# Patient Record
Sex: MALE | Race: WHITE | NOT HISPANIC OR LATINO | Employment: UNEMPLOYED | URBAN - METROPOLITAN AREA
[De-identification: names, ages, dates, MRNs, and addresses within clinical notes are randomized per-mention and may not be internally consistent; named-entity substitution may affect disease eponyms.]

---

## 2023-04-24 ENCOUNTER — LAB (OUTPATIENT)
Dept: LAB | Facility: LAB | Age: 53
End: 2023-04-24
Payer: COMMERCIAL

## 2023-04-24 ENCOUNTER — OFFICE VISIT (OUTPATIENT)
Dept: PRIMARY CARE | Facility: CLINIC | Age: 53
End: 2023-04-24
Payer: COMMERCIAL

## 2023-04-24 VITALS
BODY MASS INDEX: 30.35 KG/M2 | HEART RATE: 76 BPM | WEIGHT: 195.2 LBS | SYSTOLIC BLOOD PRESSURE: 132 MMHG | DIASTOLIC BLOOD PRESSURE: 86 MMHG | OXYGEN SATURATION: 98 % | RESPIRATION RATE: 18 BRPM

## 2023-04-24 DIAGNOSIS — R63.8 INCREASED BMI (BODY MASS INDEX): ICD-10-CM

## 2023-04-24 DIAGNOSIS — G44.86 CERVICOGENIC HEADACHE: ICD-10-CM

## 2023-04-24 DIAGNOSIS — G44.86 CERVICOGENIC HEADACHE: Primary | ICD-10-CM

## 2023-04-24 PROBLEM — H53.9 VISUAL DISTURBANCES: Status: ACTIVE | Noted: 2023-04-24

## 2023-04-24 PROBLEM — F41.8 SITUATIONAL ANXIETY: Status: ACTIVE | Noted: 2023-04-24

## 2023-04-24 PROBLEM — R09.82 POST-NASAL DRIP: Status: ACTIVE | Noted: 2023-04-24

## 2023-04-24 PROBLEM — M23.305 DERANGEMENT OF MEDIAL MENISCUS: Status: ACTIVE | Noted: 2023-04-24

## 2023-04-24 PROBLEM — N28.9 KIDNEY LESION, NATIVE, RIGHT: Status: ACTIVE | Noted: 2023-04-24

## 2023-04-24 PROBLEM — J32.3 CHRONIC SPHENOIDAL SINUSITIS: Status: ACTIVE | Noted: 2023-04-24

## 2023-04-24 PROBLEM — R07.89 ATYPICAL CHEST PAIN: Status: ACTIVE | Noted: 2023-04-24

## 2023-04-24 PROBLEM — G93.0 ARACHNOID CYST: Status: ACTIVE | Noted: 2023-04-24

## 2023-04-24 PROBLEM — T14.8XXA MUSCLE STRAIN: Status: ACTIVE | Noted: 2023-04-24

## 2023-04-24 PROBLEM — M54.2 CERVICALGIA: Status: ACTIVE | Noted: 2023-04-24

## 2023-04-24 PROBLEM — G57.10 MERALGIA PARESTHETICA: Status: ACTIVE | Noted: 2023-04-24

## 2023-04-24 PROBLEM — R10.12 LEFT UPPER QUADRANT ABDOMINAL PAIN: Status: ACTIVE | Noted: 2023-04-24

## 2023-04-24 PROBLEM — M79.18 MYOFASCIAL PAIN: Status: ACTIVE | Noted: 2023-04-24

## 2023-04-24 PROBLEM — K58.9 IRRITABLE BOWEL SYNDROME: Status: ACTIVE | Noted: 2023-04-24

## 2023-04-24 PROBLEM — R63.5 WEIGHT GAIN: Status: ACTIVE | Noted: 2023-04-24

## 2023-04-24 PROBLEM — K52.9 CHRONIC DIARRHEA: Status: ACTIVE | Noted: 2023-04-24

## 2023-04-24 PROBLEM — I10 BENIGN ESSENTIAL HTN: Status: ACTIVE | Noted: 2023-04-24

## 2023-04-24 PROBLEM — J30.9 ALLERGIC RHINITIS: Status: ACTIVE | Noted: 2023-04-24

## 2023-04-24 PROBLEM — R73.9 ELEVATED BLOOD SUGAR: Status: ACTIVE | Noted: 2023-04-24

## 2023-04-24 PROBLEM — M48.02 FORAMINAL STENOSIS OF CERVICAL REGION: Status: ACTIVE | Noted: 2023-04-24

## 2023-04-24 PROBLEM — N28.1 KIDNEY CYST, ACQUIRED: Status: ACTIVE | Noted: 2023-04-24

## 2023-04-24 PROBLEM — E78.49 ESSENTIAL FAMILIAL HYPERLIPIDEMIA: Status: ACTIVE | Noted: 2023-04-24

## 2023-04-24 PROBLEM — F41.9 ANXIETY DISORDER: Status: ACTIVE | Noted: 2023-04-24

## 2023-04-24 PROBLEM — M48.02 CERVICAL SPINAL STENOSIS: Status: ACTIVE | Noted: 2023-04-24

## 2023-04-24 PROBLEM — G47.00 INSOMNIA: Status: ACTIVE | Noted: 2023-04-24

## 2023-04-24 PROBLEM — M99.09 SEGMENTAL AND SOMATIC DYSFUNCTION: Status: ACTIVE | Noted: 2023-04-24

## 2023-04-24 PROBLEM — M79.9 POSTURAL STRAIN: Status: ACTIVE | Noted: 2023-04-24

## 2023-04-24 PROBLEM — H69.93 ETD (EUSTACHIAN TUBE DYSFUNCTION), BILATERAL: Status: ACTIVE | Noted: 2023-04-24

## 2023-04-24 PROBLEM — R51.9 HEADACHE: Status: ACTIVE | Noted: 2023-04-24

## 2023-04-24 PROBLEM — M76.60 ACHILLES TENDINITIS: Status: ACTIVE | Noted: 2023-04-24

## 2023-04-24 PROBLEM — J34.2 DEVIATED NASAL SEPTUM: Status: ACTIVE | Noted: 2023-04-24

## 2023-04-24 PROBLEM — J32.2 CHRONIC ETHMOIDAL SINUSITIS: Status: ACTIVE | Noted: 2023-04-24

## 2023-04-24 PROBLEM — M79.10 MYALGIA: Status: ACTIVE | Noted: 2023-04-24

## 2023-04-24 PROBLEM — K21.9 GERD (GASTROESOPHAGEAL REFLUX DISEASE): Status: ACTIVE | Noted: 2023-04-24

## 2023-04-24 PROBLEM — M79.606 LOWER EXTREMITY PAIN: Status: ACTIVE | Noted: 2023-04-24

## 2023-04-24 PROBLEM — M25.519 SHOULDER PAIN: Status: ACTIVE | Noted: 2023-04-24

## 2023-04-24 PROBLEM — M54.12 ACUTE CERVICAL RADICULOPATHY: Status: ACTIVE | Noted: 2023-04-24

## 2023-04-24 PROBLEM — R68.82 LIBIDO, DECREASED: Status: ACTIVE | Noted: 2023-04-24

## 2023-04-24 PROBLEM — J45.991 COUGH VARIANT ASTHMA (HHS-HCC): Status: ACTIVE | Noted: 2023-04-24

## 2023-04-24 PROBLEM — K21.00 GERD WITH ESOPHAGITIS: Status: ACTIVE | Noted: 2023-04-24

## 2023-04-24 LAB
ALANINE AMINOTRANSFERASE (SGPT) (U/L) IN SER/PLAS: 30 U/L (ref 10–52)
ALBUMIN (G/DL) IN SER/PLAS: 4.6 G/DL (ref 3.4–5)
ALKALINE PHOSPHATASE (U/L) IN SER/PLAS: 32 U/L (ref 33–120)
ANION GAP IN SER/PLAS: 15 MMOL/L (ref 10–20)
ASPARTATE AMINOTRANSFERASE (SGOT) (U/L) IN SER/PLAS: 23 U/L (ref 9–39)
BASOPHILS (10*3/UL) IN BLOOD BY AUTOMATED COUNT: 0.03 X10E9/L (ref 0–0.1)
BASOPHILS/100 LEUKOCYTES IN BLOOD BY AUTOMATED COUNT: 0.4 % (ref 0–2)
BILIRUBIN TOTAL (MG/DL) IN SER/PLAS: 0.7 MG/DL (ref 0–1.2)
C REACTIVE PROTEIN (MG/L) IN SER/PLAS: <0.1 MG/DL
CALCIUM (MG/DL) IN SER/PLAS: 9.2 MG/DL (ref 8.6–10.3)
CARBON DIOXIDE, TOTAL (MMOL/L) IN SER/PLAS: 26 MMOL/L (ref 21–32)
CHLORIDE (MMOL/L) IN SER/PLAS: 100 MMOL/L (ref 98–107)
CREATININE (MG/DL) IN SER/PLAS: 0.92 MG/DL (ref 0.5–1.3)
EOSINOPHILS (10*3/UL) IN BLOOD BY AUTOMATED COUNT: 0.05 X10E9/L (ref 0–0.7)
EOSINOPHILS/100 LEUKOCYTES IN BLOOD BY AUTOMATED COUNT: 0.7 % (ref 0–6)
ERYTHROCYTE DISTRIBUTION WIDTH (RATIO) BY AUTOMATED COUNT: 11.9 % (ref 11.5–14.5)
ERYTHROCYTE MEAN CORPUSCULAR HEMOGLOBIN CONCENTRATION (G/DL) BY AUTOMATED: 34 G/DL (ref 32–36)
ERYTHROCYTE MEAN CORPUSCULAR VOLUME (FL) BY AUTOMATED COUNT: 94 FL (ref 80–100)
ERYTHROCYTES (10*6/UL) IN BLOOD BY AUTOMATED COUNT: 4.97 X10E12/L (ref 4.5–5.9)
GFR MALE: >90 ML/MIN/1.73M2
GLUCOSE (MG/DL) IN SER/PLAS: 90 MG/DL (ref 74–99)
HEMATOCRIT (%) IN BLOOD BY AUTOMATED COUNT: 46.7 % (ref 41–52)
HEMOGLOBIN (G/DL) IN BLOOD: 15.9 G/DL (ref 13.5–17.5)
IMMATURE GRANULOCYTES/100 LEUKOCYTES IN BLOOD BY AUTOMATED COUNT: 0.3 % (ref 0–0.9)
LEUKOCYTES (10*3/UL) IN BLOOD BY AUTOMATED COUNT: 7.1 X10E9/L (ref 4.4–11.3)
LYMPHOCYTES (10*3/UL) IN BLOOD BY AUTOMATED COUNT: 1.67 X10E9/L (ref 1.2–4.8)
LYMPHOCYTES/100 LEUKOCYTES IN BLOOD BY AUTOMATED COUNT: 23.6 % (ref 13–44)
MONOCYTES (10*3/UL) IN BLOOD BY AUTOMATED COUNT: 0.45 X10E9/L (ref 0.1–1)
MONOCYTES/100 LEUKOCYTES IN BLOOD BY AUTOMATED COUNT: 6.3 % (ref 2–10)
NEUTROPHILS (10*3/UL) IN BLOOD BY AUTOMATED COUNT: 4.87 X10E9/L (ref 1.2–7.7)
NEUTROPHILS/100 LEUKOCYTES IN BLOOD BY AUTOMATED COUNT: 68.7 % (ref 40–80)
PLATELETS (10*3/UL) IN BLOOD AUTOMATED COUNT: 202 X10E9/L (ref 150–450)
POTASSIUM (MMOL/L) IN SER/PLAS: 4 MMOL/L (ref 3.5–5.3)
PROTEIN TOTAL: 7.4 G/DL (ref 6.4–8.2)
SEDIMENTATION RATE, ERYTHROCYTE: 8 MM/H (ref 0–20)
SODIUM (MMOL/L) IN SER/PLAS: 137 MMOL/L (ref 136–145)
THYROTROPIN (MIU/L) IN SER/PLAS BY DETECTION LIMIT <= 0.05 MIU/L: 0.64 MIU/L (ref 0.44–3.98)
UREA NITROGEN (MG/DL) IN SER/PLAS: 18 MG/DL (ref 6–23)

## 2023-04-24 PROCEDURE — 3075F SYST BP GE 130 - 139MM HG: CPT | Performed by: INTERNAL MEDICINE

## 2023-04-24 PROCEDURE — 3079F DIAST BP 80-89 MM HG: CPT | Performed by: INTERNAL MEDICINE

## 2023-04-24 PROCEDURE — 80053 COMPREHEN METABOLIC PANEL: CPT

## 2023-04-24 PROCEDURE — 36415 COLL VENOUS BLD VENIPUNCTURE: CPT

## 2023-04-24 PROCEDURE — 86140 C-REACTIVE PROTEIN: CPT

## 2023-04-24 PROCEDURE — 85025 COMPLETE CBC W/AUTO DIFF WBC: CPT

## 2023-04-24 PROCEDURE — 99214 OFFICE O/P EST MOD 30 MIN: CPT | Performed by: INTERNAL MEDICINE

## 2023-04-24 PROCEDURE — 85652 RBC SED RATE AUTOMATED: CPT

## 2023-04-24 PROCEDURE — 84443 ASSAY THYROID STIM HORMONE: CPT

## 2023-04-24 PROCEDURE — 1036F TOBACCO NON-USER: CPT | Performed by: INTERNAL MEDICINE

## 2023-04-24 RX ORDER — MAGNESIUM 250 MG
TABLET ORAL EVERY 12 HOURS
COMMUNITY
Start: 2021-09-01

## 2023-04-24 RX ORDER — OMEPRAZOLE 20 MG/1
1 CAPSULE, DELAYED RELEASE ORAL DAILY
COMMUNITY
Start: 2021-04-08 | End: 2023-07-17 | Stop reason: SDUPTHER

## 2023-04-24 RX ORDER — AZELASTINE HYDROCHLORIDE 0.5 MG/ML
SOLUTION/ DROPS OPHTHALMIC
COMMUNITY
End: 2023-11-06 | Stop reason: ALTCHOICE

## 2023-04-24 RX ORDER — GABAPENTIN 100 MG/1
100 CAPSULE ORAL NIGHTLY
Qty: 30 CAPSULE | Refills: 1 | Status: SHIPPED | OUTPATIENT
Start: 2023-04-24 | End: 2023-07-28 | Stop reason: ALTCHOICE

## 2023-04-24 RX ORDER — ALBUTEROL SULFATE 90 UG/1
AEROSOL, METERED RESPIRATORY (INHALATION)
COMMUNITY
Start: 2021-06-21

## 2023-04-24 RX ORDER — AMLODIPINE BESYLATE 10 MG/1
10 TABLET ORAL DAILY
COMMUNITY
End: 2023-07-28 | Stop reason: SDUPTHER

## 2023-04-24 RX ORDER — HYDROXYZINE HYDROCHLORIDE 25 MG/1
TABLET, FILM COATED ORAL
COMMUNITY
Start: 2021-08-26 | End: 2023-07-28 | Stop reason: SDUPTHER

## 2023-04-24 RX ORDER — EPINASTINE HYDROCHLORIDE 0.5 MG/ML
SOLUTION/ DROPS OPHTHALMIC
COMMUNITY
Start: 2021-06-09 | End: 2023-11-06 | Stop reason: ALTCHOICE

## 2023-04-24 RX ORDER — LATANOPROST 50 UG/ML
SOLUTION/ DROPS OPHTHALMIC
COMMUNITY
Start: 2022-01-04 | End: 2023-11-06 | Stop reason: ALTCHOICE

## 2023-04-24 ASSESSMENT — PATIENT HEALTH QUESTIONNAIRE - PHQ9
SUM OF ALL RESPONSES TO PHQ9 QUESTIONS 1 AND 2: 0
2. FEELING DOWN, DEPRESSED OR HOPELESS: NOT AT ALL
1. LITTLE INTEREST OR PLEASURE IN DOING THINGS: NOT AT ALL

## 2023-04-24 ASSESSMENT — ENCOUNTER SYMPTOMS
DEPRESSION: 0
OCCASIONAL FEELINGS OF UNSTEADINESS: 0
LOSS OF SENSATION IN FEET: 0

## 2023-04-24 NOTE — PROGRESS NOTES
Subjective   Patient ID: Chalino Soriano is a 52 y.o. male who presents for Headache and Back Pain (Lower back and upper spine).    HPI     Neck pain/Cephalgia    Review of Systems      No Fever/chills/dizziness/chest pains/ shortness of breath/palpitations/Nausea/vomiting/diarrhea/ constipation/urine frequency/blood in urine.      Objective   BP (!) 145/91 (BP Location: Left arm, Patient Position: Sitting, BP Cuff Size: Adult)   Pulse 76   Resp 18   Wt 88.5 kg (195 lb 3.2 oz)   SpO2 98%   BMI 30.35 kg/m²     Physical Exam    No JVP elevation. No palpable Lymph Nodes. No Thyromegaly    CVS-NL S1/S2 . No MRG    Lungs-CTA. B/S= B/L    Abdomen-Soft, Non-tender. No masses or HSM    Extremities: No C/C/E      Assessment/Plan        Cephalgia    Gabapentin 100 mg at bedtime/PRN    PMR consult    Stress    Elevated BMI

## 2023-04-28 ENCOUNTER — LAB (OUTPATIENT)
Dept: LAB | Facility: LAB | Age: 53
End: 2023-04-28
Payer: COMMERCIAL

## 2023-04-28 DIAGNOSIS — R68.82 LIBIDO, DECREASED: Primary | ICD-10-CM

## 2023-04-28 DIAGNOSIS — R68.82 LIBIDO, DECREASED: ICD-10-CM

## 2023-04-28 PROCEDURE — 36415 COLL VENOUS BLD VENIPUNCTURE: CPT

## 2023-04-28 PROCEDURE — 84402 ASSAY OF FREE TESTOSTERONE: CPT

## 2023-04-28 PROCEDURE — 84403 ASSAY OF TOTAL TESTOSTERONE: CPT

## 2023-05-09 LAB
TESTOSTERONE FREE (CHAN): 65.6 PG/ML (ref 35–155)
TESTOSTERONE,TOTAL,LC-MS/MS: 345 NG/DL (ref 250–1100)

## 2023-07-12 ENCOUNTER — TELEPHONE (OUTPATIENT)
Dept: PRIMARY CARE | Facility: CLINIC | Age: 53
End: 2023-07-12
Payer: COMMERCIAL

## 2023-07-12 NOTE — TELEPHONE ENCOUNTER
PT stated that he would like a CB about his recent lab results and would also like to discuss some acid reflux issues he has been having

## 2023-07-17 DIAGNOSIS — K21.9 GASTROESOPHAGEAL REFLUX DISEASE, UNSPECIFIED WHETHER ESOPHAGITIS PRESENT: ICD-10-CM

## 2023-07-17 RX ORDER — OMEPRAZOLE 20 MG/1
20 CAPSULE, DELAYED RELEASE ORAL DAILY
Qty: 30 CAPSULE | Refills: 2 | Status: SHIPPED | OUTPATIENT
Start: 2023-07-17 | End: 2024-02-15 | Stop reason: SDUPTHER

## 2023-07-17 NOTE — TELEPHONE ENCOUNTER
Spoke to patient and informed him that [facility] said his total and free and T levels were all normal and he would like for him to start omeprazole 20mg daily. I informed patient that I will send that over to his pharmacy on file. Patient had a few questions but stated that he will call back tomorrow. Patient was identified with full name and date of birth.       Dee Sales MA

## 2023-07-20 ENCOUNTER — TELEPHONE (OUTPATIENT)
Dept: PRIMARY CARE | Facility: CLINIC | Age: 53
End: 2023-07-20
Payer: COMMERCIAL

## 2023-07-28 ENCOUNTER — LAB (OUTPATIENT)
Dept: LAB | Facility: LAB | Age: 53
End: 2023-07-28
Payer: COMMERCIAL

## 2023-07-28 ENCOUNTER — OFFICE VISIT (OUTPATIENT)
Dept: PRIMARY CARE | Facility: CLINIC | Age: 53
End: 2023-07-28
Payer: COMMERCIAL

## 2023-07-28 VITALS
SYSTOLIC BLOOD PRESSURE: 139 MMHG | HEIGHT: 69 IN | OXYGEN SATURATION: 96 % | BODY MASS INDEX: 29.44 KG/M2 | RESPIRATION RATE: 18 BRPM | HEART RATE: 76 BPM | WEIGHT: 198.8 LBS | DIASTOLIC BLOOD PRESSURE: 94 MMHG

## 2023-07-28 DIAGNOSIS — I10 BENIGN ESSENTIAL HTN: ICD-10-CM

## 2023-07-28 DIAGNOSIS — Z00.00 HEALTHCARE MAINTENANCE: Primary | ICD-10-CM

## 2023-07-28 DIAGNOSIS — F51.01 PRIMARY INSOMNIA: ICD-10-CM

## 2023-07-28 DIAGNOSIS — E78.49 ESSENTIAL FAMILIAL HYPERLIPIDEMIA: ICD-10-CM

## 2023-07-28 DIAGNOSIS — Z00.00 HEALTHCARE MAINTENANCE: ICD-10-CM

## 2023-07-28 LAB
ALANINE AMINOTRANSFERASE (SGPT) (U/L) IN SER/PLAS: 28 U/L (ref 10–52)
ALBUMIN (G/DL) IN SER/PLAS: 4.6 G/DL (ref 3.4–5)
ALKALINE PHOSPHATASE (U/L) IN SER/PLAS: 33 U/L (ref 33–120)
ANION GAP IN SER/PLAS: 13 MMOL/L (ref 10–20)
APPEARANCE, URINE: CLEAR
ASPARTATE AMINOTRANSFERASE (SGOT) (U/L) IN SER/PLAS: 20 U/L (ref 9–39)
BASOPHILS (10*3/UL) IN BLOOD BY AUTOMATED COUNT: 0.03 X10E9/L (ref 0–0.1)
BASOPHILS/100 LEUKOCYTES IN BLOOD BY AUTOMATED COUNT: 0.7 % (ref 0–2)
BILIRUBIN TOTAL (MG/DL) IN SER/PLAS: 0.6 MG/DL (ref 0–1.2)
BILIRUBIN, URINE: NEGATIVE
BLOOD, URINE: NEGATIVE
CALCIUM (MG/DL) IN SER/PLAS: 9.3 MG/DL (ref 8.6–10.3)
CARBON DIOXIDE, TOTAL (MMOL/L) IN SER/PLAS: 27 MMOL/L (ref 21–32)
CHLORIDE (MMOL/L) IN SER/PLAS: 103 MMOL/L (ref 98–107)
CHOLESTEROL (MG/DL) IN SER/PLAS: 223 MG/DL (ref 0–199)
CHOLESTEROL IN HDL (MG/DL) IN SER/PLAS: 70.2 MG/DL
CHOLESTEROL/HDL RATIO: 3.2
COLOR, URINE: YELLOW
CREATININE (MG/DL) IN SER/PLAS: 0.93 MG/DL (ref 0.5–1.3)
EOSINOPHILS (10*3/UL) IN BLOOD BY AUTOMATED COUNT: 0.09 X10E9/L (ref 0–0.7)
EOSINOPHILS/100 LEUKOCYTES IN BLOOD BY AUTOMATED COUNT: 2 % (ref 0–6)
ERYTHROCYTE DISTRIBUTION WIDTH (RATIO) BY AUTOMATED COUNT: 12 % (ref 11.5–14.5)
ERYTHROCYTE MEAN CORPUSCULAR HEMOGLOBIN CONCENTRATION (G/DL) BY AUTOMATED: 33.9 G/DL (ref 32–36)
ERYTHROCYTE MEAN CORPUSCULAR VOLUME (FL) BY AUTOMATED COUNT: 95 FL (ref 80–100)
ERYTHROCYTES (10*6/UL) IN BLOOD BY AUTOMATED COUNT: 4.96 X10E12/L (ref 4.5–5.9)
GFR MALE: >90 ML/MIN/1.73M2
GLUCOSE (MG/DL) IN SER/PLAS: 108 MG/DL (ref 74–99)
GLUCOSE, URINE: NEGATIVE MG/DL
HEMATOCRIT (%) IN BLOOD BY AUTOMATED COUNT: 47.2 % (ref 41–52)
HEMOGLOBIN (G/DL) IN BLOOD: 16 G/DL (ref 13.5–17.5)
IMMATURE GRANULOCYTES/100 LEUKOCYTES IN BLOOD BY AUTOMATED COUNT: 0.2 % (ref 0–0.9)
KETONES, URINE: NEGATIVE MG/DL
LDL: 137 MG/DL (ref 0–99)
LEUKOCYTE ESTERASE, URINE: NEGATIVE
LEUKOCYTES (10*3/UL) IN BLOOD BY AUTOMATED COUNT: 4.5 X10E9/L (ref 4.4–11.3)
LYMPHOCYTES (10*3/UL) IN BLOOD BY AUTOMATED COUNT: 1.24 X10E9/L (ref 1.2–4.8)
LYMPHOCYTES/100 LEUKOCYTES IN BLOOD BY AUTOMATED COUNT: 27.9 % (ref 13–44)
MONOCYTES (10*3/UL) IN BLOOD BY AUTOMATED COUNT: 0.38 X10E9/L (ref 0.1–1)
MONOCYTES/100 LEUKOCYTES IN BLOOD BY AUTOMATED COUNT: 8.5 % (ref 2–10)
NEUTROPHILS (10*3/UL) IN BLOOD BY AUTOMATED COUNT: 2.7 X10E9/L (ref 1.2–7.7)
NEUTROPHILS/100 LEUKOCYTES IN BLOOD BY AUTOMATED COUNT: 60.7 % (ref 40–80)
NITRITE, URINE: NEGATIVE
PH, URINE: 7 (ref 5–8)
PLATELETS (10*3/UL) IN BLOOD AUTOMATED COUNT: 218 X10E9/L (ref 150–450)
POTASSIUM (MMOL/L) IN SER/PLAS: 4.6 MMOL/L (ref 3.5–5.3)
PROSTATE SPECIFIC ANTIGEN,SCREEN: 0.66 NG/ML (ref 0–4)
PROTEIN TOTAL: 7.1 G/DL (ref 6.4–8.2)
PROTEIN, URINE: NEGATIVE MG/DL
SODIUM (MMOL/L) IN SER/PLAS: 138 MMOL/L (ref 136–145)
SPECIFIC GRAVITY, URINE: 1.02 (ref 1–1.03)
THYROTROPIN (MIU/L) IN SER/PLAS BY DETECTION LIMIT <= 0.05 MIU/L: 1.33 MIU/L (ref 0.44–3.98)
TRIGLYCERIDE (MG/DL) IN SER/PLAS: 80 MG/DL (ref 0–149)
UREA NITROGEN (MG/DL) IN SER/PLAS: 20 MG/DL (ref 6–23)
UROBILINOGEN, URINE: <2 MG/DL (ref 0–1.9)
VLDL: 16 MG/DL (ref 0–40)

## 2023-07-28 PROCEDURE — 3080F DIAST BP >= 90 MM HG: CPT | Performed by: INTERNAL MEDICINE

## 2023-07-28 PROCEDURE — 81003 URINALYSIS AUTO W/O SCOPE: CPT

## 2023-07-28 PROCEDURE — 84153 ASSAY OF PSA TOTAL: CPT

## 2023-07-28 PROCEDURE — 1036F TOBACCO NON-USER: CPT | Performed by: INTERNAL MEDICINE

## 2023-07-28 PROCEDURE — 99396 PREV VISIT EST AGE 40-64: CPT | Performed by: INTERNAL MEDICINE

## 2023-07-28 PROCEDURE — 80053 COMPREHEN METABOLIC PANEL: CPT

## 2023-07-28 PROCEDURE — 36415 COLL VENOUS BLD VENIPUNCTURE: CPT

## 2023-07-28 PROCEDURE — 84443 ASSAY THYROID STIM HORMONE: CPT

## 2023-07-28 PROCEDURE — 3075F SYST BP GE 130 - 139MM HG: CPT | Performed by: INTERNAL MEDICINE

## 2023-07-28 PROCEDURE — 85025 COMPLETE CBC W/AUTO DIFF WBC: CPT

## 2023-07-28 PROCEDURE — 80061 LIPID PANEL: CPT

## 2023-07-28 PROCEDURE — 83036 HEMOGLOBIN GLYCOSYLATED A1C: CPT

## 2023-07-28 RX ORDER — AMLODIPINE BESYLATE 10 MG/1
10 TABLET ORAL DAILY
Qty: 90 TABLET | Refills: 3 | Status: SHIPPED | OUTPATIENT
Start: 2023-07-28 | End: 2023-08-31 | Stop reason: SDUPTHER

## 2023-07-28 RX ORDER — HYDROXYZINE HYDROCHLORIDE 25 MG/1
25 TABLET, FILM COATED ORAL NIGHTLY
Qty: 30 TABLET | Refills: 1 | Status: SHIPPED | OUTPATIENT
Start: 2023-07-28 | End: 2023-11-06 | Stop reason: ALTCHOICE

## 2023-07-28 NOTE — PROGRESS NOTES
"Subjective   Patient ID: Chalino Soriano is a 52 y.o. male who presents for Annual Exam.    52 M    Doing Well    HTN    CRISTI    HPI     Review of Systems    + Acid reflux    No Fever/chills/headaches/dizziness/chest pains/ shortness of breath/palpitations/Nausea/vomiting/diarrhea/ constipation/urine frequency/blood in urine.      Objective   BP (!) 139/94 (BP Location: Left arm, Patient Position: Sitting, BP Cuff Size: Adult)   Pulse 76   Resp 18   Ht 1.75 m (5' 8.9\")   Wt 90.2 kg (198 lb 12.8 oz)   SpO2 96%   BMI 29.44 kg/m²     Physical Exam    No JVP elevation. No palpable Lymph Nodes. No Thyromegaly    CVS-NL S1/S2 . No MRG    Lungs-CTA. B/S= B/L    Abdomen-Soft, Non-tender. No masses or HSM    Extremities: No C/C/E    No masses or hernia. Normal prostate        Assessment/Plan     52 M    Doing Well    HTN-Goal< 130/80-Continue with current Rx    CRISTI    Insomnia- Start Hydroxyzine 25 mg at bedtime/PRN    CRC-Current    Continue with current Rx    Follow up in 12 months /PRN           "

## 2023-07-31 ENCOUNTER — TELEPHONE (OUTPATIENT)
Dept: PRIMARY CARE | Facility: CLINIC | Age: 53
End: 2023-07-31
Payer: COMMERCIAL

## 2023-07-31 DIAGNOSIS — Z00.00 HEALTHCARE MAINTENANCE: ICD-10-CM

## 2023-07-31 NOTE — TELEPHONE ENCOUNTER
----- Message from Rohit Olvera MD sent at 7/28/2023  4:04 PM EDT -----  Regarding: Labs  Elevated Total+LDL-Cholesterol levels-     Limit animal fats( red meat, cheese, shell fish,  egg yolks, full fat dairy/yoghurt and processed meats).  Instead, replace some of the saturated fats in your diet with healthier unsaturated fats, which are found in fish, nuts, avocados and vegetable oils, such as olive oil, canola oil and safflower oil.      Increase physical activity-minimum of 150 minutes per week of moderate exercise.   ----- Message -----  From: Lab, Background User  Sent: 7/28/2023  11:50 AM EDT  To: Rohit Olvera MD

## 2023-07-31 NOTE — TELEPHONE ENCOUNTER
Called patient and informed him of [facility] message about his results. Patient was in full understanding and had no other questions or concerns. Patient was in full understanding and was identified with full name and date of birth.     Dee Sales MA

## 2023-08-01 LAB
ESTIMATED AVERAGE GLUCOSE FOR HBA1C: 100 MG/DL
HEMOGLOBIN A1C/HEMOGLOBIN TOTAL IN BLOOD: 5.1 %

## 2023-08-31 DIAGNOSIS — I10 BENIGN ESSENTIAL HTN: ICD-10-CM

## 2023-09-01 RX ORDER — AMLODIPINE BESYLATE 10 MG/1
10 TABLET ORAL DAILY
Qty: 90 TABLET | Refills: 3 | Status: SHIPPED | OUTPATIENT
Start: 2023-09-01 | End: 2023-11-06 | Stop reason: ALTCHOICE

## 2023-10-03 RX ORDER — BRIMONIDINE TARTRATE AND TIMOLOL MALEATE 2; 5 MG/ML; MG/ML
SOLUTION OPHTHALMIC
COMMUNITY
Start: 2009-02-26

## 2023-10-03 RX ORDER — TRIAMCINOLONE ACETONIDE 55 UG/1
SPRAY, METERED NASAL
COMMUNITY
Start: 2009-02-26

## 2023-10-03 RX ORDER — AZELASTINE 1 MG/ML
SPRAY, METERED NASAL
COMMUNITY
Start: 2021-01-07 | End: 2023-11-06 | Stop reason: ALTCHOICE

## 2023-10-03 RX ORDER — AMLODIPINE BESYLATE 5 MG/1
1 TABLET ORAL DAILY
COMMUNITY
Start: 2022-05-20 | End: 2023-11-06 | Stop reason: DRUGHIGH

## 2023-10-06 ENCOUNTER — APPOINTMENT (OUTPATIENT)
Dept: PHYSICAL THERAPY | Facility: CLINIC | Age: 53
End: 2023-10-06
Payer: COMMERCIAL

## 2023-10-12 ENCOUNTER — APPOINTMENT (OUTPATIENT)
Dept: PHYSICAL THERAPY | Facility: CLINIC | Age: 53
End: 2023-10-12
Payer: COMMERCIAL

## 2023-10-18 ENCOUNTER — APPOINTMENT (OUTPATIENT)
Dept: GASTROENTEROLOGY | Facility: CLINIC | Age: 53
End: 2023-10-18
Payer: COMMERCIAL

## 2023-10-20 ENCOUNTER — APPOINTMENT (OUTPATIENT)
Dept: PHYSICAL THERAPY | Facility: CLINIC | Age: 53
End: 2023-10-20
Payer: COMMERCIAL

## 2023-11-06 ENCOUNTER — OFFICE VISIT (OUTPATIENT)
Dept: PHYSICAL MEDICINE AND REHAB | Facility: CLINIC | Age: 53
End: 2023-11-06
Payer: COMMERCIAL

## 2023-11-06 VITALS
BODY MASS INDEX: 29.62 KG/M2 | TEMPERATURE: 97.3 F | HEIGHT: 69 IN | WEIGHT: 200 LBS | HEART RATE: 75 BPM | OXYGEN SATURATION: 97 % | SYSTOLIC BLOOD PRESSURE: 150 MMHG | DIASTOLIC BLOOD PRESSURE: 93 MMHG

## 2023-11-06 DIAGNOSIS — M79.18 MYOFASCIAL PAIN: ICD-10-CM

## 2023-11-06 DIAGNOSIS — M79.10 MYALGIA: ICD-10-CM

## 2023-11-06 DIAGNOSIS — M79.9 POSTURAL STRAIN: ICD-10-CM

## 2023-11-06 DIAGNOSIS — M79.606 PAIN OF LOWER EXTREMITY, UNSPECIFIED LATERALITY: Primary | ICD-10-CM

## 2023-11-06 DIAGNOSIS — M48.02 FORAMINAL STENOSIS OF CERVICAL REGION: ICD-10-CM

## 2023-11-06 DIAGNOSIS — M54.12 ACUTE CERVICAL RADICULOPATHY: ICD-10-CM

## 2023-11-06 DIAGNOSIS — T14.8XXA MUSCLE STRAIN: ICD-10-CM

## 2023-11-06 PROCEDURE — 3077F SYST BP >= 140 MM HG: CPT | Performed by: PHYSICAL MEDICINE & REHABILITATION

## 2023-11-06 PROCEDURE — 3080F DIAST BP >= 90 MM HG: CPT | Performed by: PHYSICAL MEDICINE & REHABILITATION

## 2023-11-06 PROCEDURE — 1036F TOBACCO NON-USER: CPT | Performed by: PHYSICAL MEDICINE & REHABILITATION

## 2023-11-06 PROCEDURE — 99214 OFFICE O/P EST MOD 30 MIN: CPT | Performed by: PHYSICAL MEDICINE & REHABILITATION

## 2023-11-06 ASSESSMENT — PAIN SCALES - GENERAL: PAINLEVEL: 0-NO PAIN

## 2023-11-06 NOTE — PROGRESS NOTES
Provider Impressions     This is a pleasant 53-year-old man with past medical history significant for HTN, asthma, GERD, HLD, insomnia, who presents for follow-up of neck and upper back pain and bilateral anterior thigh numbness/tingling. Physical exam was reassuring for lack of neurological compromise and was unremarkable. Differential diagnosis includes cervical radiculopathy, cervical spondylosis with reactive myofascial pain/tension, and bilateral anterolateral thigh numbness could be due to compression of the lateral femoral cutaneous nerve from keeping his wallet and keys in his pant pockets, sitting for long periods of time while driving and wearing tight jeans around the waist.     --Try Voltaren gel on the area of pain 4 times per day for 2 weeks straight and then as needed up to 4 times per day.  This is over-the-counter.   -Continue Tumeric 1000 mg per day +curcumin daily   -Licart patch sample provided previously  -Continue cognitive behavioral therapy with your therapist  -Look into Calm or Curable pain apps on your phone  -OMT referral provided previously - [phone]  - continue physical therapy working on active strengthening exercises, exercises provided again.  -Handout on sleep hygiene provided  -Consider medications in the future  -Consider injections in the future including trigger point injections, referral for facet block/RFA  -Consider ultrasound-guided costochondral injection  -Follow-up as needed.  If you need to make an appointment, then schedule through my staff or [facility]     The patient expressed understanding and agreement with the assessment and plan. Patient encouraged to contact us should they have any questions, concerns, or any changes in symptoms.      Thank you for allowing me to participate in the care of your patient.        ** Dictated with voice recognition software, please forgive any errors in grammar and/or spelling **      Chief Complaint     Back and  neck pain follow-up      History of Present Illness    This is a pleasant 53-year-old man with past medical history significant for HTN, asthma, GERD, HLD, insomnia, who presents for follow-up of neck and upper back pain.     He was last seen here on 5/10/2023, at which point I advised him to continue turmeric daily, with him a Licart patch sample for his. He didn't try the patch.    I gave him referral for OMT and physical therapy. This helped, and he's doing his HEP.    Sometimes he has been feeling some Ant midclavicular pain on the L with some exercises and with prolonged poor posture.     A lot of things going on this past year, father passed away, mother into SNF.    He rates his pain as 0/10, previously 3-4/10     Otherwise, there have been no changes to his medications or past medical history since last visit     ___________________________  4/11/2022: No-show  5/10/2023: PT, strengthening exercises, referral for OMT, consider medications and exercises, Licart patch sample provided  11/6/2023: Try Voltaren gel, continue exercises, information about sleep hygiene provided, follow-up as needed  ___________________________  As a reminder:     TIMELINE OF COMPLAINT(S): Patient states that his neck pain gradually began 5-7 years ago. Denies trauma/injury. Pain was on and off for this time period. He did Pilates 2 years ago which helped the pain. He currently does strength and core exercises classes now, which helps. He goes to the classes 2-3/week. He also does home exercises 2-3x/week. Pain is better overall since he has been doing the classes and exercises.     He keeps his wallet, phone, and keys in his front pocket and complains of intermittent anterior lateral thigh numbness.     There has been a great deal of stress lately with a divorce, and taking care of both elderly parents.     Pain:  LOCATION- bilateral neck, bilateral thighs  RADIATION-occasionally radiates to bilateral arms, occasionally has  numbness in bilateral anterior thighs when sitting too long  ASSOCIATED WITH- None  NUMBNESS/TINGLING- Yes, arm and legs, anterior lateral thighs  WEAKNESS- No  CONSTANT or INTERMITTENT- Intermittent  SEVERITY/QUANTITY- 1-2  QUALITY- Burning, pressure, ache  EXACERBATED BY- thighs - sitting for long periods, neck - activity  BETTER WITH- Exercise, stretching  TRIED-   Anti-Inflammatories: Advil, turmeric, Medrol Dosepak, prednisone   Muscle relaxants: no   Anti-depressants: no   Neuroleptics: no   LDN: no     PHYSICAL THERAPY: Yes, 2021 went to one session because he got busy  TENS UNIT: No  CHIROPRACTIC MANIPULATION: No  ACUPUNCTURE TREATMENTS: No  DEEP TISSUE MASSAGE THERAPY: Yes, 2021  OSTEOPATHIC MANIPULATION THERAPY: No  INJECTIONS: No  EMG/NCS: 9/16/21 Dr. Garcia Normal LLE     IMAGING: Yes  9/28/19 Cervical MRI: Disc herniations at the C4-5, C5-6, and C6-7 levels. Degenerative uncovertebral joint changes at the C5-6 level. Posterior fossa arachnoid cyst     FUNCTIONAL HISTORY: The patient is independent in all ADLs, mobility, and driving. The patient does not use any assistive device.     SH:  [place]  Lives with: Daughter  Occupation: Sales  Tobacco: No  Alcohol: Yes, occasionally  Drugs: No     ___________________________  ROS: The patient denies any bowel or bladder incontinence/accidents, night sweats, fevers, chills, recent significant weight loss. A 14 point review of systems was reviewed with the patient and is as above and otherwise negative. ROS questionnaire positive for muscle pain/tightness,      Physical Exam  PE:  GEN - Alert, well-developed, well-nourished, no acute distress  PSYCH - Cooperative, appropriate mood and affect  HEENT - NC/AT  RESP - Non-labored respirations, equal expansion  CV - warm and well-perfused, No cyanosis or edema in extremities.   SKIN - No visible rash.     There was tenderness in the midclavicular 12th rib at the costochondral  junction.    Previously:  5/5 strength in all major muscle groups of the upper extremities, deep tendon reflexes normal and equal in the bilateral upper extremities     Previous:  NECK/EXTR- Cervical ROM is full with forward flexion, extension, rotation, and side bending with no pain. Spurlings and Lhermitte's negative. No tenderness of the cervical spinous processes. No tenderness of the cervical paraspinal muscles and trapezei musculature as well as the scapular musculature. Scapulae are symmetrical without evidence of medial or lateral winging.     Shoulder ROM full with flexion, abduction, external and internal rotation. No tenderness of SC, AC, or bicipital groove. Negative Empty can test. Negative Neer's test. Negative Hawkin's test. Negative Rankin. Negative Speed's test.      BACK/SPINE - Symmetric posture, no erythema, edema, or swelling. No back pain with lumbar flexion, extension, rotation, or side bend. No pain with facet loading. No tenderness of lumbosacral spinous processes. No tenderness over the left lumbar paraspinal muscles, iliolumbar ligaments bilaterally. No TTP of bilateral PSIS, sacral sulcus, gluteus medius, piriformis, greater trochanters, or IT band. Sacral compression negative bilaterally. Straight leg raise negative bilaterally.     HIPS/PELVIS - Symmetric in standing and lying. Passive hip flexion, internal rotation, and external rotation within functional normal limits bilaterally without provocation of pain symptoms. No tenderness over iliopsoas tendon. Negative FABERs bilaterally. Negative hip clicking. hip Negative hip impingement test. Negative log roll.     NEURO - UE strength 5/5 - including shoulder abduction, biceps, triceps, wrist extensors, finger flexors, interossei, and    LE strength 5/5 - including hip flexors, knee flexors, knee extensors, ankle dorsiflexors, ankle plantar flexors, and EHL   Sensation - intact to light touch in bilateral upper and lower extremities.    Reflexes - 2+ biceps, brachioradialis, triceps, patellar and Achilles reflexes bilaterally. No Clonus  GAIT - Normal base, normal stride length, non-antalgic.

## 2023-11-06 NOTE — PATIENT INSTRUCTIONS
-Try Voltaren gel on the area of pain 4 times per day for 2 weeks straight and then as needed up to 4 times per day.  This is over-the-counter.   -Continue Tumeric 1000 mg per day +curcumin daily   -Licart patch sample provided previously  -Continue cognitive behavioral therapy with your therapist  -Look into Calm or Curable pain apps on your phone  -OMT referral provided previously - [phone]  - continue physical therapy working on active strengthening exercises, exercises provided again.  -Handout on sleep hygiene provided  -Consider medications in the future  -Consider ultrasound-guided costochondral injection  -Consider injections in the future including trigger point injections, referral for facet block/RFA  -Follow-up as needed.  If you need to make an appointment, then schedule through my staff or through [facility].

## 2023-11-10 ENCOUNTER — APPOINTMENT (OUTPATIENT)
Dept: PHYSICAL THERAPY | Facility: CLINIC | Age: 53
End: 2023-11-10
Payer: COMMERCIAL

## 2023-11-27 ENCOUNTER — APPOINTMENT (OUTPATIENT)
Dept: GASTROENTEROLOGY | Facility: CLINIC | Age: 53
End: 2023-11-27
Payer: COMMERCIAL

## 2023-12-06 ENCOUNTER — TELEPHONE (OUTPATIENT)
Dept: PRIMARY CARE | Facility: CLINIC | Age: 53
End: 2023-12-06
Payer: COMMERCIAL

## 2023-12-07 DIAGNOSIS — I10 BENIGN ESSENTIAL HTN: ICD-10-CM

## 2023-12-07 RX ORDER — AMLODIPINE BESYLATE 10 MG/1
10 TABLET ORAL DAILY
Qty: 90 TABLET | Refills: 0 | Status: SHIPPED | OUTPATIENT
Start: 2023-12-07 | End: 2024-02-02 | Stop reason: SDUPTHER

## 2023-12-07 NOTE — TELEPHONE ENCOUNTER
Called patient and left a voice mail informing him that I was returning his phone call. I did see the amlodipine 10 mg and will have rmb send over a refill.       Dee Sales MA

## 2023-12-11 ENCOUNTER — APPOINTMENT (OUTPATIENT)
Dept: PRIMARY CARE | Facility: CLINIC | Age: 53
End: 2023-12-11
Payer: COMMERCIAL

## 2024-01-08 ENCOUNTER — TREATMENT (OUTPATIENT)
Dept: PHYSICAL THERAPY | Facility: CLINIC | Age: 54
End: 2024-01-08
Payer: COMMERCIAL

## 2024-01-08 DIAGNOSIS — M54.2 CERVICALGIA: Primary | ICD-10-CM

## 2024-01-08 PROCEDURE — 97140 MANUAL THERAPY 1/> REGIONS: CPT | Mod: GP | Performed by: PHYSICAL THERAPIST

## 2024-01-08 PROCEDURE — 97110 THERAPEUTIC EXERCISES: CPT | Mod: GP | Performed by: PHYSICAL THERAPIST

## 2024-01-08 PROCEDURE — 97164 PT RE-EVAL EST PLAN CARE: CPT | Mod: GP | Performed by: PHYSICAL THERAPIST

## 2024-01-08 ASSESSMENT — ENCOUNTER SYMPTOMS
DEPRESSION: 0
LOSS OF SENSATION IN FEET: 0
OCCASIONAL FEELINGS OF UNSTEADINESS: 0

## 2024-01-08 NOTE — PROGRESS NOTES
"Physical Therapy    Physical Therapy Treatment    Patient Name: Chalino Soriano  MRN: [id number]  Today's Date: 1/8/2024  Time Calculation  Start Time: 0200  Stop Time: 0300  Time Calculation (min): 60 min  Visit #7    Assessment:  Recheck shows some restriction in c-spine AROM, thoracic stiffness, weakness in scapular stabilizers and pectoral tightness. Chalino continues to be a good candidate for skilled PT to address these impairments to reduce pain.    Plan:  Continue manual therapy, self-mobs/stretches and scapular stabilization.    Current Problem  1. Cervicalgia            Subjective    \"My neck and shoulders still feel tight. I need to learn how to stretch them out. I've been doing the therapy exercises every day.\"    Objective   Ortho   Palpation: (-) TTP    Observation: forward shoulders; rounded shoulders; B scapulae anteriorly tipped    Neck AROM:   Flexion- min restriction   Extension- min restriction  Lateral flexion- WNL/ min restriction  Rotation- WNL/ min restriction    Shoulder AROM WNL, no reproduction of neck pain    Shoulder MMT (R/L):   Flexion- 4+/5; 4+/5  Abduction- 5/5; 5/5  ER- 4+/5; 4+/5  IR- 5/5; 5/5  Serratus anterior- 4/5; 4/5  Middle trapezius- 4/5; 4/5  Lower trapezius- 4-/5; 4-/5  Rhomboids- 4/5; 4/5    Pec Minor length: R- 7 cm; L- 6 cm    Special tests:   (-) Cervical compression/ distraction  (-) Spurling's.     Outcome Measures   Neck Pain Disability Index score: 11 = 22%    Treatments:  Manual Therapy  Seated CTJ mob  Supine thoracic mob  Massage gun to B pectorals  Suboccipital release  Cervical lateral glides    Therapeutic Exercise  Foam roller pectoral stretch x2 mins ea position  Foam roller thoracic mobs 3x30 sec  Open book stretch x10 w/ 10 sec hold ea leg  Cervical snags x10 w/ 10 sec hold ea way  Chair thoracic mob x10 w/ 10 sec hold    Goals:  Active       PT Problem       We are continuing the therapy plan of care and goals that were documented in the legacy EMR.  Please " refer to the PT (or OT) plan of care in the EMR for treatment plan and goals.        Start:  01/08/24    Expected End:  03/08/24

## 2024-01-16 ENCOUNTER — APPOINTMENT (OUTPATIENT)
Dept: PRIMARY CARE | Facility: CLINIC | Age: 54
End: 2024-01-16
Payer: COMMERCIAL

## 2024-01-22 ENCOUNTER — APPOINTMENT (OUTPATIENT)
Dept: PHYSICAL THERAPY | Facility: CLINIC | Age: 54
End: 2024-01-22
Payer: COMMERCIAL

## 2024-01-22 ENCOUNTER — DOCUMENTATION (OUTPATIENT)
Dept: PHYSICAL THERAPY | Facility: CLINIC | Age: 54
End: 2024-01-22
Payer: COMMERCIAL

## 2024-01-22 NOTE — PROGRESS NOTES
Physical Therapy                 Therapy Communication Note    Patient Name: Chalino Soriano  MRN: [id number]  Today's Date: 1/22/2024     Discipline: Physical Therapy    Missed Visit Reason:      Missed Time: Cancel    Comment:

## 2024-01-30 ENCOUNTER — OFFICE VISIT (OUTPATIENT)
Dept: GASTROENTEROLOGY | Facility: HOSPITAL | Age: 54
End: 2024-01-30
Payer: COMMERCIAL

## 2024-01-30 VITALS — WEIGHT: 190 LBS | HEART RATE: 75 BPM | HEIGHT: 69 IN | BODY MASS INDEX: 28.14 KG/M2

## 2024-01-30 DIAGNOSIS — D12.6 SERRATED ADENOMA OF COLON: ICD-10-CM

## 2024-01-30 DIAGNOSIS — K21.9 GASTROESOPHAGEAL REFLUX DISEASE WITHOUT ESOPHAGITIS: Primary | ICD-10-CM

## 2024-01-30 PROCEDURE — 1036F TOBACCO NON-USER: CPT | Performed by: INTERNAL MEDICINE

## 2024-01-30 PROCEDURE — 99214 OFFICE O/P EST MOD 30 MIN: CPT | Performed by: INTERNAL MEDICINE

## 2024-01-30 RX ORDER — LATANOPROST 50 UG/ML
1 SOLUTION/ DROPS OPHTHALMIC NIGHTLY
COMMUNITY
Start: 2024-01-20

## 2024-01-30 ASSESSMENT — ENCOUNTER SYMPTOMS
ENDOCRINE NEGATIVE: 1
GASTROINTESTINAL NEGATIVE: 1
RESPIRATORY NEGATIVE: 1
CONSTITUTIONAL NEGATIVE: 1
CARDIOVASCULAR NEGATIVE: 1
EYES NEGATIVE: 1
NEUROLOGICAL NEGATIVE: 1
HEMATOLOGIC/LYMPHATIC NEGATIVE: 1
MUSCULOSKELETAL NEGATIVE: 1
PSYCHIATRIC NEGATIVE: 1

## 2024-01-30 NOTE — PROGRESS NOTES
Gastroesophageal reflux  Serrated adenoma    History of Present Illness:   Chalino Soriano is a 53 y.o. male with PMHx of gastroesophageal reflux, kidney cyst, anxiety, and a history of a serrated adenoma who presents to GI clinic for follow up.  Last seen around 2022 for colonoscopy.  He discusses many complaints 1 of which is losing weight, some foot issues of possible plantars fasciitis, occasional acid reflux, and we reviewed his last colonoscopy.  He has no dysphagia, vomiting blood or black stool.  He is scheduled to see his primary doctor for routine blood work and cholesterol evaluation.  I reviewed his endoscopy from 2018 which suggested possible eosinophilic esophagitis though he is completely asymptomatic.  I also reviewed his colonoscopy and he is due for repeat in 2027.  He has no other complaints but has many questions concerning dietary measures or should he be taking vitamins.    His last endoscopy, colonoscopy and labs are reviewed    Review of Systems  Review of Systems   Constitutional: Negative.    HENT: Negative.     Eyes: Negative.    Respiratory: Negative.     Cardiovascular: Negative.    Gastrointestinal: Negative.    Endocrine: Negative.    Genitourinary: Negative.    Musculoskeletal: Negative.    Skin: Negative.    Neurological: Negative.    Hematological: Negative.    Psychiatric/Behavioral: Negative.         Allergies  Allergies   Allergen Reactions    Aspirin Other    Sulfamethoxazole Hives       Medications  Current Outpatient Medications   Medication Instructions    albuterol 90 mcg/actuation inhaler inhalation    amLODIPine (NORVASC) 10 mg, oral, Daily    brimonidine-timoloL (Combigan) 0.2-0.5 % ophthalmic solution 1 drop each eye once daily    latanoprost (Xalatan) 0.005 % ophthalmic solution 1 drop, Both Eyes, Nightly    magnesium 250 mg tablet oral, Every 12 hours    omeprazole (PRILOSEC) 20 mg, oral, Daily    triamcinolone (Nasacort) 55 mcg nasal inhaler Nasocort:  2 spray into  each nostril daily.        Objective   Visit Vitals  Pulse 75      Physical Exam  Constitutional:       Appearance: Normal appearance.   Eyes:      Conjunctiva/sclera: Conjunctivae normal.   Cardiovascular:      Rate and Rhythm: Normal rate and regular rhythm.   Pulmonary:      Effort: Pulmonary effort is normal.      Breath sounds: Normal breath sounds.   Abdominal:      General: Abdomen is flat. Bowel sounds are normal.      Palpations: Abdomen is soft.   Musculoskeletal:         General: Normal range of motion.      Cervical back: Normal range of motion.   Neurological:      Mental Status: He is alert.           Lab Results   Component Value Date    WBC 4.5 07/28/2023    WBC 7.1 04/24/2023    WBC 4.5 07/22/2022    HGB 16.0 07/28/2023    HGB 15.9 04/24/2023    HGB 16.1 07/22/2022    HCT 47.2 07/28/2023    HCT 46.7 04/24/2023    HCT 46.6 07/22/2022     07/28/2023     04/24/2023     07/22/2022     Lab Results   Component Value Date     07/28/2023     04/24/2023     07/22/2022    K 4.6 07/28/2023    K 4.0 04/24/2023    K 4.0 07/22/2022     07/28/2023     04/24/2023     07/22/2022    CO2 27 07/28/2023    CO2 26 04/24/2023    CO2 26 07/22/2022    BUN 20 07/28/2023    BUN 18 04/24/2023    BUN 18 07/22/2022    CREATININE 0.93 07/28/2023    CREATININE 0.92 04/24/2023    CREATININE 0.95 07/22/2022    CALCIUM 9.3 07/28/2023    CALCIUM 9.2 04/24/2023    CALCIUM 9.4 07/22/2022    PROT 7.1 07/28/2023    PROT 7.4 04/24/2023    PROT 7.4 07/22/2022    BILITOT 0.6 07/28/2023    BILITOT 0.7 04/24/2023    BILITOT 0.8 07/22/2022    ALKPHOS 33 07/28/2023    ALKPHOS 32 (L) 04/24/2023    ALKPHOS 30 (L) 07/22/2022    ALT 28 07/28/2023    ALT 30 04/24/2023    ALT 23 07/22/2022    AST 20 07/28/2023    AST 23 04/24/2023    AST 19 07/22/2022    GLUCOSE 108 (H) 07/28/2023    GLUCOSE 90 04/24/2023    GLUCOSE 98 07/22/2022           Chalino Soriano is a 53 y.o. male who presents to GI  clinic for GERD, serrated adenoma, anxiety.    GERD (gastroesophageal reflux disease)  Patient is a 53-year-old with a history of chronic acid reflux who now is on no medication and denies any alarming signs or symptoms.  Specifically no bleeding or dysphagia.  I would recommend strict acid reflux precautions and lifestyle changes of eating healthy, exercising and not eating before bed.    Serrated adenoma of colon  Patient is a 53-year-old with a serrated adenoma colonoscopy 2022 should have repeat in 2027.       Alfonzo Treviño MD

## 2024-01-30 NOTE — ASSESSMENT & PLAN NOTE
Patient is a 53-year-old with a history of chronic acid reflux who now is on no medication and denies any alarming signs or symptoms.  Specifically no bleeding or dysphagia.  I would recommend strict acid reflux precautions and lifestyle changes of eating healthy, exercising and not eating before bed.

## 2024-02-02 DIAGNOSIS — I10 BENIGN ESSENTIAL HTN: ICD-10-CM

## 2024-02-05 RX ORDER — AMLODIPINE BESYLATE 10 MG/1
10 TABLET ORAL DAILY
Qty: 90 TABLET | Refills: 0 | Status: SHIPPED | OUTPATIENT
Start: 2024-02-05 | End: 2024-06-06 | Stop reason: SDUPTHER

## 2024-02-14 ENCOUNTER — TELEPHONE (OUTPATIENT)
Dept: PRIMARY CARE | Facility: CLINIC | Age: 54
End: 2024-02-14
Payer: COMMERCIAL

## 2024-02-15 DIAGNOSIS — K21.9 GASTROESOPHAGEAL REFLUX DISEASE, UNSPECIFIED WHETHER ESOPHAGITIS PRESENT: ICD-10-CM

## 2024-02-16 RX ORDER — OMEPRAZOLE 20 MG/1
20 CAPSULE, DELAYED RELEASE ORAL DAILY
Qty: 30 CAPSULE | Refills: 0 | Status: SHIPPED | OUTPATIENT
Start: 2024-02-16 | End: 2025-02-15

## 2024-03-05 ENCOUNTER — OFFICE VISIT (OUTPATIENT)
Dept: PRIMARY CARE | Facility: CLINIC | Age: 54
End: 2024-03-05
Payer: COMMERCIAL

## 2024-03-05 VITALS
BODY MASS INDEX: 29.36 KG/M2 | HEART RATE: 73 BPM | RESPIRATION RATE: 18 BRPM | WEIGHT: 198.8 LBS | OXYGEN SATURATION: 98 %

## 2024-03-05 DIAGNOSIS — M72.2 PLANTAR FASCIITIS, BILATERAL: Primary | ICD-10-CM

## 2024-03-05 PROBLEM — R19.7 DIARRHEA: Status: ACTIVE | Noted: 2024-03-05

## 2024-03-05 PROBLEM — R63.8 INCREASED BODY MASS INDEX (BMI): Status: ACTIVE | Noted: 2023-04-24

## 2024-03-05 PROBLEM — H69.90 DYSFUNCTION OF EUSTACHIAN TUBE: Status: ACTIVE | Noted: 2023-04-24

## 2024-03-05 PROCEDURE — 1036F TOBACCO NON-USER: CPT | Performed by: INTERNAL MEDICINE

## 2024-03-05 PROCEDURE — 99213 OFFICE O/P EST LOW 20 MIN: CPT | Performed by: INTERNAL MEDICINE

## 2024-03-05 RX ORDER — AZELASTINE 1 MG/ML
SPRAY, METERED NASAL
COMMUNITY
Start: 2024-02-15

## 2024-03-05 NOTE — PROGRESS NOTES
Subjective   Patient ID: Chalino Soriano is a 53 y.o. male who presents for Follow-up.    L foot pain    B/L Heel pain x months    GERD    Cephalgia    HPI     Review of Systems      No Fever/chills/headaches/dizziness/chest pains/ cough/ shortness of breath/palpitations/ abdominal pain /Nausea/vomiting/diarrhea/ constipation/urine frequency/blood in urine.      Objective   Pulse 73   Resp 18   Wt 90.2 kg (198 lb 12.8 oz)   SpO2 98%   BMI 29.36 kg/m²     Physical Exam    No JVP elevation. No palpable Lymph Nodes. No Thyromegaly    HEENT- Negative    CVS-NL S1/S2 . No MRG    Lungs-CTA. B/S= B/L    Abdomen-Soft, Non-tender. No masses or HSM    Extremities: No C/C/E    Skin-No abnormal moles/rash        Assessment/Plan     L foot pain    B/L Heel pain x months    GERD     Cephalgia    Plan    Orthopedics consult re plantar fasciitis    Elevated BMI- Tighten diet- Increase protein/fish/fiber intake and limit processed/refined carbohydrates and added sugars. Increased physical activity to a minimum of 150 minutes of moderate exercise per week.     Follow up in 6 months/PRN

## 2024-03-15 ENCOUNTER — APPOINTMENT (OUTPATIENT)
Dept: ORTHOPEDIC SURGERY | Facility: CLINIC | Age: 54
End: 2024-03-15
Payer: COMMERCIAL

## 2024-03-21 ENCOUNTER — OFFICE VISIT (OUTPATIENT)
Dept: ORTHOPEDIC SURGERY | Facility: CLINIC | Age: 54
End: 2024-03-21
Payer: COMMERCIAL

## 2024-03-21 ENCOUNTER — HOSPITAL ENCOUNTER (OUTPATIENT)
Dept: RADIOLOGY | Facility: CLINIC | Age: 54
Discharge: HOME | End: 2024-03-21
Payer: COMMERCIAL

## 2024-03-21 VITALS — WEIGHT: 198 LBS | BODY MASS INDEX: 29.33 KG/M2 | HEIGHT: 69 IN

## 2024-03-21 DIAGNOSIS — M77.40 METATARSALGIA, UNSPECIFIED LATERALITY: ICD-10-CM

## 2024-03-21 DIAGNOSIS — M79.672 FOOT PAIN, BILATERAL: ICD-10-CM

## 2024-03-21 DIAGNOSIS — M79.671 FOOT PAIN, BILATERAL: ICD-10-CM

## 2024-03-21 DIAGNOSIS — M62.469 GASTROCNEMIUS EQUINUS, UNSPECIFIED LATERALITY: Primary | ICD-10-CM

## 2024-03-21 DIAGNOSIS — M72.2 PLANTAR FASCIITIS: ICD-10-CM

## 2024-03-21 DIAGNOSIS — S99.922S INJURY OF PLANTAR PLATE OF LEFT FOOT, SEQUELA: ICD-10-CM

## 2024-03-21 PROCEDURE — 73630 X-RAY EXAM OF FOOT: CPT | Mod: BILATERAL PROCEDURE | Performed by: RADIOLOGY

## 2024-03-21 PROCEDURE — 99204 OFFICE O/P NEW MOD 45 MIN: CPT | Performed by: ORTHOPAEDIC SURGERY

## 2024-03-21 PROCEDURE — 1036F TOBACCO NON-USER: CPT | Performed by: ORTHOPAEDIC SURGERY

## 2024-03-21 PROCEDURE — 99214 OFFICE O/P EST MOD 30 MIN: CPT | Performed by: ORTHOPAEDIC SURGERY

## 2024-03-21 PROCEDURE — 73630 X-RAY EXAM OF FOOT: CPT | Mod: 50

## 2024-03-21 ASSESSMENT — PAIN DESCRIPTION - DESCRIPTORS: DESCRIPTORS: ACHING;SORE

## 2024-03-21 ASSESSMENT — PAIN SCALES - GENERAL: PAINLEVEL_OUTOF10: 4

## 2024-03-21 ASSESSMENT — PAIN - FUNCTIONAL ASSESSMENT: PAIN_FUNCTIONAL_ASSESSMENT: 0-10

## 2024-03-21 NOTE — PROGRESS NOTES
"Patient was reviewed and discussed with MANUEL and/or orthopedic resident.  Patient was seen and evaluated in conjunction with MANUEL and/or orthopedic resident. Findings and treatment plan were discussed and approved by myself, Dr. Crowe.    Also notes pain in the ball of his left foot.  Had some injections remotely for \"neuroma\".    On exam:  WD/WN athletic male  A+O X3  NAD  No lymphedema  Inspection of both feet and ankles show symmetric arches.   Able to STR bilaterally.   Point tender plantar fascia both heels.  Tender under left foot second and third metatarsal heads.  Pain with anterior drawer left second and third MTP joints.  Mild pain in the second webspace.  5/5 strength in all 4 planes.   Sensation intact to LT.   Good pulses.   Stable anterior drawer.  No peroneal subluxation.    (+) Teresa.     I personally reviewed the following radiographic exams: X-rays of both feet shows no acute changes.  No arthritis.    Assessment: Tight gastroc with bilateral plantar fasciitis.  Metatarsalgia left foot with plantar plate overload.    Plan: Discussed nonoperative and operative options in detail.   Risk and benefits discussed in detail. All questions answered today.  Recovery timeline and expectations discussed in detail.  Discussed at length stretching exercises for his Achilles and plantar fascia.  Discussed supportive arches and shoewear.  Prescription for physical therapy given if necessary.  I think his left forefoot pain will probably resolve if he gets better flexibility of his Achilles.  Consider advanced imaging if pain continues.  "

## 2024-03-21 NOTE — PROGRESS NOTES
53 year old male with bilateral atraumatic foot pain for 3-4 months. The pain is localized to the plantar surface of both feet and is similar on both sides. The pain is worst in the morning when first getting out of bed. He has tried orthotic shoe inserts without much relief. Has not done PT at this time.

## 2024-03-29 ENCOUNTER — APPOINTMENT (OUTPATIENT)
Dept: PHYSICAL THERAPY | Facility: CLINIC | Age: 54
End: 2024-03-29
Payer: COMMERCIAL

## 2024-05-03 ENCOUNTER — APPOINTMENT (OUTPATIENT)
Dept: PHYSICAL THERAPY | Facility: CLINIC | Age: 54
End: 2024-05-03
Payer: COMMERCIAL

## 2024-05-21 ENCOUNTER — TELEPHONE (OUTPATIENT)
Dept: PRIMARY CARE | Facility: CLINIC | Age: 54
End: 2024-05-21

## 2024-05-21 ENCOUNTER — OFFICE VISIT (OUTPATIENT)
Dept: PRIMARY CARE | Facility: HOSPITAL | Age: 54
End: 2024-05-21
Payer: COMMERCIAL

## 2024-05-21 VITALS
HEART RATE: 82 BPM | BODY MASS INDEX: 29.25 KG/M2 | TEMPERATURE: 97.9 F | DIASTOLIC BLOOD PRESSURE: 78 MMHG | OXYGEN SATURATION: 98 % | SYSTOLIC BLOOD PRESSURE: 140 MMHG | WEIGHT: 197.5 LBS | HEIGHT: 69 IN

## 2024-05-21 DIAGNOSIS — M94.0 COSTOCHONDRITIS: Primary | ICD-10-CM

## 2024-05-21 PROCEDURE — 1036F TOBACCO NON-USER: CPT | Performed by: INTERNAL MEDICINE

## 2024-05-21 PROCEDURE — 3077F SYST BP >= 140 MM HG: CPT | Performed by: INTERNAL MEDICINE

## 2024-05-21 PROCEDURE — 3078F DIAST BP <80 MM HG: CPT | Performed by: INTERNAL MEDICINE

## 2024-05-21 PROCEDURE — 99213 OFFICE O/P EST LOW 20 MIN: CPT | Performed by: INTERNAL MEDICINE

## 2024-05-21 RX ORDER — MELOXICAM 15 MG/1
15 TABLET ORAL DAILY
Qty: 30 TABLET | Refills: 1 | Status: SHIPPED | OUTPATIENT
Start: 2024-05-21

## 2024-05-21 ASSESSMENT — ENCOUNTER SYMPTOMS
OCCASIONAL FEELINGS OF UNSTEADINESS: 0
DEPRESSION: 0
LOSS OF SENSATION IN FEET: 0

## 2024-05-21 NOTE — TELEPHONE ENCOUNTER
PT is having some pain under his ribs and would like Dr. Olvera to check him out today if possible

## 2024-05-21 NOTE — PROGRESS NOTES
Chief Complaint:   New Patient Visit (Left side pain)     History Of Present Illness:    Chalino Soriano is a 53 y.o. male with active medical problems outlined below who presents for evaluation and management of left sided lower chest wall pain.    Having pain in left lateral ribs - worse when in car and with certain stretching movements.  Not as apparent at rest and when sleeping   Had similar symptoms in past from pilates and pure bare   Went away without any treatment and now back  Some am cough, but not coughing in general and not out of breath.     Normal appetite, No N/V, no pain with swallowing  No back pain, no skin rash.  Colonoscopy 6/22       Patient Active Problem List   Diagnosis    Achilles tendinitis    Acute cervical radiculopathy    Cervical spinal stenosis    Allergic rhinitis    Anxiety disorder    Situational anxiety    Arachnoid cyst    Atypical chest pain    Benign essential HTN    Chronic diarrhea    Chronic ethmoidal sinusitis    Chronic sphenoidal sinusitis    Cough variant asthma ([facility])    Derangement of medial meniscus    Deviated nasal septum    Elevated blood sugar    Essential familial hyperlipidemia    ETD (Eustachian tube dysfunction), bilateral    Foraminal stenosis of cervical region    GERD (gastroesophageal reflux disease)    GERD with esophagitis    Cervicalgia    Headache    Insomnia    Irritable bowel syndrome    Kidney cyst, acquired    Kidney lesion, native, right    Left upper quadrant abdominal pain    Libido, decreased    Lower extremity pain    Meralgia paresthetica    Muscle strain    Myalgia    Myofascial pain    Post-nasal drip    Postural strain    Segmental and somatic dysfunction    Shoulder pain    Visual disturbances    Weight gain    Adjustment disorder with anxious mood    Abdominal pain    Neck pain    Serrated adenoma of colon    Diarrhea    Dysfunction of eustachian tube    Increased body mass index (BMI)    Stress    Costochondritis       Current  "Outpatient Medications:     amLODIPine (Norvasc) 10 mg tablet, Take 1 tablet (10 mg) by mouth once daily., Disp: 90 tablet, Rfl: 0    latanoprost (Xalatan) 0.005 % ophthalmic solution, Administer 1 drop into both eyes once daily at bedtime., Disp: , Rfl:     magnesium 250 mg tablet, Take by mouth every 12 hours., Disp: , Rfl:     omeprazole (PriLOSEC) 20 mg DR capsule, Take 1 capsule (20 mg) by mouth once daily., Disp: 30 capsule, Rfl: 0    albuterol 90 mcg/actuation inhaler, Inhale., Disp: , Rfl:     azelastine (Astelin) 137 mcg (0.1 %) nasal spray, INSTILL 2 SPRAYS INTO THE NOSE TWICE A DAY AS NEEDED, Disp: , Rfl:     brimonidine-timoloL (Combigan) 0.2-0.5 % ophthalmic solution, 1 drop each eye once daily, Disp: , Rfl:     meloxicam (Mobic) 15 mg tablet, Take 1 tablet (15 mg) by mouth once daily., Disp: 30 tablet, Rfl: 1    triamcinolone (Nasacort) 55 mcg nasal inhaler, Nasocort:  2 spray into each nostril daily., Disp: , Rfl:   Aspirin and Sulfamethoxazole  Social History     Tobacco Use    Smoking status: Never    Smokeless tobacco: Never   Vaping Use    Vaping status: Never Used   Substance Use Topics    Alcohol use: Yes     Alcohol/week: 10.0 standard drinks of alcohol     Types: 6 Glasses of wine, 4 Cans of beer per week     Comment: couple times a week    Drug use: Never         All pertinent aspects of medical and surgical history were reviewed and updated in chart    Review of Systems   Pertinent positives in review of systems outlined above.  Complete ROS otherwise negative.        Last Recorded Vitals:  Patient Vitals for the past 24 hrs:   BP Temp Pulse SpO2 Height Weight   05/21/24 1125 140/78 -- -- -- -- --   05/21/24 1059 (!) 154/97 36.6 °C (97.9 °F) 82 98 % 1.753 m (5' 9\") 89.6 kg (197 lb 8 oz)          Physical Exam  Eyes:      Extraocular Movements: Extraocular movements intact.      Conjunctiva/sclera: Conjunctivae normal.      Pupils: Pupils are equal, round, and reactive to light. "   Cardiovascular:      Rate and Rhythm: Normal rate and regular rhythm.      Pulses: Normal pulses.      Heart sounds: Normal heart sounds.   Pulmonary:      Effort: Pulmonary effort is normal.      Breath sounds: Normal breath sounds.   Abdominal:      General: Abdomen is flat. Bowel sounds are normal.      Palpations: Abdomen is soft. There is no mass.   Musculoskeletal:      Comments: Tender to palpation over inferior margin of left lateral ribs.  No mass palpated, no skin changes.              The 10-year ASCVD risk score (Alen DK, et al., 2019) is: 5.3%    Values used to calculate the score:      Age: 53 years      Sex: Male      Is Non- : No      Diabetic: No      Tobacco smoker: No      Systolic Blood Pressure: 140 mmHg      Is BP treated: Yes      HDL Cholesterol: 70.2 mg/dL      Total Cholesterol: 223 mg/dL      Assessment/Plan   Problem List Items Addressed This Visit       Costochondritis - Primary     Tenderness to palpation of left lower rib margin consistent with costochondritis.  Recommend rest and heat or ice alternating.  To begin meloxicam daily.  To call if not improving.          Relevant Medications    meloxicam (Mobic) 15 mg tablet       A total of 20 minutes was spent reviewing the chart and recent testing and discussing plan of care.         Luther Hernandez MD

## 2024-05-21 NOTE — PATIENT INSTRUCTIONS
Rest for one week    Take meloxicam once daily     Low FODMAP diet     Avoid: mint, citrus (tomato), caffeine, alcohol, high fat foods, spicy foods, onion

## 2024-05-22 NOTE — ASSESSMENT & PLAN NOTE
Tenderness to palpation of left lower rib margin consistent with costochondritis.  Recommend rest and heat or ice alternating.  To begin meloxicam daily.  To call if not improving.

## 2024-06-06 DIAGNOSIS — I10 BENIGN ESSENTIAL HTN: ICD-10-CM

## 2024-06-06 RX ORDER — AMLODIPINE BESYLATE 10 MG/1
10 TABLET ORAL DAILY
Qty: 90 TABLET | Refills: 0 | Status: SHIPPED | OUTPATIENT
Start: 2024-06-06 | End: 2024-09-04

## 2024-07-02 ENCOUNTER — APPOINTMENT (OUTPATIENT)
Dept: PRIMARY CARE | Facility: CLINIC | Age: 54
End: 2024-07-02
Payer: COMMERCIAL

## 2024-08-16 ENCOUNTER — APPOINTMENT (OUTPATIENT)
Dept: PRIMARY CARE | Facility: CLINIC | Age: 54
End: 2024-08-16
Payer: COMMERCIAL

## 2024-09-03 DIAGNOSIS — I10 BENIGN ESSENTIAL HTN: ICD-10-CM

## 2024-09-03 RX ORDER — AMLODIPINE BESYLATE 10 MG/1
10 TABLET ORAL DAILY
Qty: 90 TABLET | Refills: 0 | Status: SHIPPED | OUTPATIENT
Start: 2024-09-03 | End: 2024-12-02

## 2024-09-06 ENCOUNTER — APPOINTMENT (OUTPATIENT)
Dept: PRIMARY CARE | Facility: CLINIC | Age: 54
End: 2024-09-06
Payer: COMMERCIAL

## 2024-09-27 ENCOUNTER — APPOINTMENT (OUTPATIENT)
Dept: PRIMARY CARE | Facility: CLINIC | Age: 54
End: 2024-09-27
Payer: COMMERCIAL

## 2024-09-27 ENCOUNTER — LAB (OUTPATIENT)
Dept: LAB | Facility: LAB | Age: 54
End: 2024-09-27
Payer: COMMERCIAL

## 2024-09-27 VITALS
HEART RATE: 82 BPM | HEIGHT: 69 IN | WEIGHT: 200 LBS | SYSTOLIC BLOOD PRESSURE: 139 MMHG | DIASTOLIC BLOOD PRESSURE: 85 MMHG | TEMPERATURE: 98.1 F | BODY MASS INDEX: 29.62 KG/M2

## 2024-09-27 DIAGNOSIS — E78.5 DYSLIPIDEMIA: ICD-10-CM

## 2024-09-27 DIAGNOSIS — R73.9 HYPERGLYCEMIA: ICD-10-CM

## 2024-09-27 DIAGNOSIS — Z11.59 NEED FOR HEPATITIS C SCREENING TEST: ICD-10-CM

## 2024-09-27 DIAGNOSIS — Z51.81 ENCOUNTER FOR MEDICATION MONITORING: ICD-10-CM

## 2024-09-27 DIAGNOSIS — Z11.4 SCREENING FOR HIV (HUMAN IMMUNODEFICIENCY VIRUS): ICD-10-CM

## 2024-09-27 DIAGNOSIS — Z12.5 ENCOUNTER FOR PROSTATE CANCER SCREENING: ICD-10-CM

## 2024-09-27 DIAGNOSIS — I10 UNCONTROLLED HYPERTENSION: ICD-10-CM

## 2024-09-27 DIAGNOSIS — Z00.00 ROUTINE GENERAL MEDICAL EXAMINATION AT A HEALTH CARE FACILITY: ICD-10-CM

## 2024-09-27 DIAGNOSIS — M76.62 INSERTIONAL TENDINOPATHY OF LEFT ACHILLES TENDON: ICD-10-CM

## 2024-09-27 DIAGNOSIS — Z00.00 ROUTINE GENERAL MEDICAL EXAMINATION AT A HEALTH CARE FACILITY: Primary | ICD-10-CM

## 2024-09-27 LAB
ALBUMIN SERPL BCP-MCNC: 4.6 G/DL (ref 3.4–5)
ALP SERPL-CCNC: 31 U/L (ref 33–120)
ALT SERPL W P-5'-P-CCNC: 28 U/L (ref 10–52)
ANION GAP SERPL CALC-SCNC: 12 MMOL/L (ref 10–20)
AST SERPL W P-5'-P-CCNC: 19 U/L (ref 9–39)
BASOPHILS # BLD AUTO: 0.01 X10*3/UL (ref 0–0.1)
BASOPHILS NFR BLD AUTO: 0.2 %
BILIRUB SERPL-MCNC: 0.7 MG/DL (ref 0–1.2)
BUN SERPL-MCNC: 19 MG/DL (ref 6–23)
CALCIUM SERPL-MCNC: 9.5 MG/DL (ref 8.6–10.3)
CHLORIDE SERPL-SCNC: 102 MMOL/L (ref 98–107)
CHOLEST SERPL-MCNC: 228 MG/DL (ref 0–199)
CHOLESTEROL/HDL RATIO: 3.5
CO2 SERPL-SCNC: 26 MMOL/L (ref 21–32)
CREAT SERPL-MCNC: 0.95 MG/DL (ref 0.5–1.3)
EGFRCR SERPLBLD CKD-EPI 2021: >90 ML/MIN/1.73M*2
EOSINOPHIL # BLD AUTO: 0.09 X10*3/UL (ref 0–0.7)
EOSINOPHIL NFR BLD AUTO: 1.9 %
ERYTHROCYTE [DISTWIDTH] IN BLOOD BY AUTOMATED COUNT: 11.9 % (ref 11.5–14.5)
GLUCOSE SERPL-MCNC: 108 MG/DL (ref 74–99)
HCT VFR BLD AUTO: 45.2 % (ref 41–52)
HCV AB SER QL: NONREACTIVE
HDLC SERPL-MCNC: 65.8 MG/DL
HGB BLD-MCNC: 16 G/DL (ref 13.5–17.5)
HIV 1+2 AB+HIV1 P24 AG SERPL QL IA: NONREACTIVE
IMM GRANULOCYTES # BLD AUTO: 0.01 X10*3/UL (ref 0–0.7)
IMM GRANULOCYTES NFR BLD AUTO: 0.2 % (ref 0–0.9)
LDLC SERPL CALC-MCNC: 147 MG/DL
LYMPHOCYTES # BLD AUTO: 1.35 X10*3/UL (ref 1.2–4.8)
LYMPHOCYTES NFR BLD AUTO: 28.4 %
MCH RBC QN AUTO: 32.6 PG (ref 26–34)
MCHC RBC AUTO-ENTMCNC: 35.4 G/DL (ref 32–36)
MCV RBC AUTO: 92 FL (ref 80–100)
MONOCYTES # BLD AUTO: 0.37 X10*3/UL (ref 0.1–1)
MONOCYTES NFR BLD AUTO: 7.8 %
NEUTROPHILS # BLD AUTO: 2.92 X10*3/UL (ref 1.2–7.7)
NEUTROPHILS NFR BLD AUTO: 61.5 %
NON HDL CHOLESTEROL: 162 MG/DL (ref 0–149)
NRBC BLD-RTO: 0 /100 WBCS (ref 0–0)
PLATELET # BLD AUTO: 205 X10*3/UL (ref 150–450)
POTASSIUM SERPL-SCNC: 4.4 MMOL/L (ref 3.5–5.3)
PROT SERPL-MCNC: 7 G/DL (ref 6.4–8.2)
PSA SERPL-MCNC: 0.62 NG/ML
RBC # BLD AUTO: 4.91 X10*6/UL (ref 4.5–5.9)
SODIUM SERPL-SCNC: 136 MMOL/L (ref 136–145)
TRIGL SERPL-MCNC: 77 MG/DL (ref 0–149)
TSH SERPL-ACNC: 1.27 MIU/L (ref 0.44–3.98)
VLDL: 15 MG/DL (ref 0–40)
WBC # BLD AUTO: 4.8 X10*3/UL (ref 4.4–11.3)

## 2024-09-27 PROCEDURE — 85025 COMPLETE CBC W/AUTO DIFF WBC: CPT

## 2024-09-27 PROCEDURE — 83036 HEMOGLOBIN GLYCOSYLATED A1C: CPT | Mod: AHULAB | Performed by: STUDENT IN AN ORGANIZED HEALTH CARE EDUCATION/TRAINING PROGRAM

## 2024-09-27 PROCEDURE — 86803 HEPATITIS C AB TEST: CPT

## 2024-09-27 PROCEDURE — 1036F TOBACCO NON-USER: CPT | Performed by: STUDENT IN AN ORGANIZED HEALTH CARE EDUCATION/TRAINING PROGRAM

## 2024-09-27 PROCEDURE — 80053 COMPREHEN METABOLIC PANEL: CPT

## 2024-09-27 PROCEDURE — 80061 LIPID PANEL: CPT

## 2024-09-27 PROCEDURE — 36415 COLL VENOUS BLD VENIPUNCTURE: CPT

## 2024-09-27 PROCEDURE — 3075F SYST BP GE 130 - 139MM HG: CPT | Performed by: STUDENT IN AN ORGANIZED HEALTH CARE EDUCATION/TRAINING PROGRAM

## 2024-09-27 PROCEDURE — 99396 PREV VISIT EST AGE 40-64: CPT | Performed by: STUDENT IN AN ORGANIZED HEALTH CARE EDUCATION/TRAINING PROGRAM

## 2024-09-27 PROCEDURE — G0103 PSA SCREENING: HCPCS

## 2024-09-27 PROCEDURE — 3079F DIAST BP 80-89 MM HG: CPT | Performed by: STUDENT IN AN ORGANIZED HEALTH CARE EDUCATION/TRAINING PROGRAM

## 2024-09-27 PROCEDURE — 3008F BODY MASS INDEX DOCD: CPT | Performed by: STUDENT IN AN ORGANIZED HEALTH CARE EDUCATION/TRAINING PROGRAM

## 2024-09-27 PROCEDURE — 84443 ASSAY THYROID STIM HORMONE: CPT

## 2024-09-27 PROCEDURE — 87389 HIV-1 AG W/HIV-1&-2 AB AG IA: CPT

## 2024-09-27 ASSESSMENT — ENCOUNTER SYMPTOMS
LIGHT-HEADEDNESS: 0
UNEXPECTED WEIGHT CHANGE: 0
HEMATURIA: 0
EYE PAIN: 0
CHILLS: 0
DIZZINESS: 0
ABDOMINAL PAIN: 0
SHORTNESS OF BREATH: 0
FEVER: 0
RHINORRHEA: 0

## 2024-09-27 NOTE — PROGRESS NOTES
"Subjective     Patient ID: Chalino Soriano is a 53 y.o. male who presents today for a Preventative Visit/Physical.    Current Diet: regular diet    Current Physical Activity: walking and weightlifting    Tobacco/Alcohol/Drug Use:   Social History     Tobacco Use    Smoking status: Never    Smokeless tobacco: Never   Substance Use Topics    Alcohol use: Yes     Alcohol/week: 10.0 standard drinks of alcohol     Types: 6 Glasses of wine, 4 Cans of beer per week     Comment: couple times a week       Required Screenings/Immunizations:   Health Maintenance Due   Topic Date Due    HIV Screening  Never done    Hepatitis C Screening  Never done    Hepatitis B Vaccines (1 of 3 - 19+ 3-dose series) Never done       Problems to be addressed today in addition to Preventative Services: As stated in orders.     Review of Systems   Constitutional:  Negative for chills, fever and unexpected weight change.   HENT:  Negative for rhinorrhea.    Eyes:  Negative for pain.   Respiratory:  Negative for shortness of breath.    Cardiovascular:  Negative for chest pain.   Gastrointestinal:  Negative for abdominal pain.   Genitourinary:  Negative for hematuria.   Skin:  Negative for rash.   Neurological:  Negative for dizziness, syncope and light-headedness.   Psychiatric/Behavioral:  Negative for behavioral problems and suicidal ideas.        /85 (BP Location: Left arm, Patient Position: Sitting, BP Cuff Size: Adult)   Pulse 82   Temp 36.7 °C (98.1 °F) (Oral)   Ht 1.753 m (5' 9\")   Wt 90.7 kg (200 lb)   BMI 29.53 kg/m²      Objective   Physical Exam  Vitals reviewed.   Constitutional:       General: He is not in acute distress.  HENT:      Head: Normocephalic.      Right Ear: External ear normal.      Left Ear: External ear normal.      Nose: No rhinorrhea.      Mouth/Throat:      Mouth: Mucous membranes are moist.   Eyes:      Conjunctiva/sclera: Conjunctivae normal.   Cardiovascular:      Rate and Rhythm: Normal rate and " "regular rhythm.      Heart sounds: No murmur heard.     No friction rub. No gallop.   Pulmonary:      Effort: No respiratory distress.      Breath sounds: No wheezing, rhonchi or rales.   Abdominal:      General: Bowel sounds are normal. There is no distension.      Palpations: Abdomen is soft.      Tenderness: There is no abdominal tenderness.   Musculoskeletal:      Cervical back: Neck supple.      Right lower leg: No edema.      Left lower leg: No edema.   Skin:     General: Skin is warm and dry.      Capillary Refill: Capillary refill takes less than 2 seconds.   Neurological:      Mental Status: He is alert.      Gait: Gait normal.           Labs:   Lab Results   Component Value Date    WBC 4.5 07/28/2023    HGB 16.0 07/28/2023    HCT 47.2 07/28/2023     07/28/2023    TSH 1.33 07/28/2023    PSA 0.61 07/22/2022     Lab Results   Component Value Date     07/28/2023    K 4.6 07/28/2023     07/28/2023    BUN 20 07/28/2023    CREATININE 0.93 07/28/2023    GLUCOSE 108 (H) 07/28/2023    CALCIUM 9.3 07/28/2023    PROT 7.1 07/28/2023    BILITOT 0.6 07/28/2023    ALKPHOS 33 07/28/2023    AST 20 07/28/2023    ALT 28 07/28/2023     Lab Results   Component Value Date    CHOL 223 (H) 07/28/2023    CHOL 227 (H) 07/22/2022    CHOL 258 (H) 11/25/2020      Lab Results   Component Value Date    TRIG 80 07/28/2023    TRIG 91 07/22/2022    TRIG 80 11/25/2020      Lab Results   Component Value Date    HDL 70.2 07/28/2023    HDL 76.5 07/22/2022    HDL 72.9 11/25/2020     No results found for: \"LDLCALC\"   Lab Results   Component Value Date    VLDL 16 07/28/2023    VLDL 18 07/22/2022    VLDL 16 11/25/2020    No components found for: \"CHOLHDLRATI0\"    Imaging/Testing: XR foot 3+ views bilateral  Narrative: Interpreted By:  Radha Villasenor,   STUDY:  Bilateral feet, 3 views each.      INDICATION:  Signs/Symptoms:pain.      COMPARISON:  None.      ACCESSION NUMBER(S):  [id number]      ORDERING CLINICIAN:  JOSE" SEEMA      STUDY:  Left foot:  No acute fracture or malalignment.  Mild 1st MTP joint osteoarthrosis with small osteophytes.  Soft tissues are within normal limits.      Right foot:  No acute fracture or malalignment.  Mild 1st MTP joint osteoarthrosis with small osteophytes.  Soft tissues are within normal limits.      Impression: Mild bilateral 1st MTP joint osteoarthrosis.      MACRO:  None.      Signed by: Radha Villasenor 3/22/2024 10:08 PM  Dictation workstation:   [id number]    Assessment/Plan   Problem List Items Addressed This Visit    None  Visit Diagnoses       Routine general medical examination at a health care facility    -  Primary    Relevant Orders    Thyroid Stimulating Hormone    Uncontrolled hypertension        Insertional tendinopathy of left Achilles tendon        Relevant Orders    Referral to Physical Therapy    Encounter for medication monitoring        Relevant Orders    CBC and Auto Differential    Comprehensive Metabolic Panel    Encounter for prostate cancer screening        Relevant Orders    Prostate Specific Antigen, Screen    Dyslipidemia        Relevant Orders    Lipid panel    Screening for HIV (human immunodeficiency virus)        Relevant Orders    HIV 1/2 Antigen/Antibody Screen with Reflex to Confirmation    Need for hepatitis C screening test        Relevant Orders    Hepatitis C antibody              As part of today's Preventative Visit, an age and gender-appropriate history and physical was performed, as documented below. Counseling and anticipatory guidance were performed, and risk factor reduction interventions (including [place] Preventative Services Task Force recommended screening tests) were utilized/ordered as outlined in the above Assessment and Plan. All patient medications were reviewed, and refilled if necessary.    Patient and I discussed diet/nutrition, lifestyle modifications, safety, medication indications and side effects, and health  goals.    current treatment plan is effective, no change in therapy, orders and follow up as documented in EMR, lab results reviewed with patient, repeat labs ordered prior to next appointment, reviewed compliance with lifestyle measures, reviewed diet, exercise and weight control, reviewed medications and side effects in detail     3 Months for HTN. Patient will bring in home BP logs.       I have reviewed OARRS report, consistent with prescribed medication. I have considered risks of abuse, diversion, side effects and feel clinically benefit of medication outweighs risks at this time.

## 2024-09-28 LAB
EST. AVERAGE GLUCOSE BLD GHB EST-MCNC: 108 MG/DL
HBA1C MFR BLD: 5.4 %

## 2024-11-06 DIAGNOSIS — I10 BENIGN ESSENTIAL HTN: ICD-10-CM

## 2024-11-06 RX ORDER — AMLODIPINE BESYLATE 10 MG/1
10 TABLET ORAL DAILY
Qty: 90 TABLET | Refills: 3 | Status: SHIPPED | OUTPATIENT
Start: 2024-11-06

## 2025-03-19 ENCOUNTER — APPOINTMENT (OUTPATIENT)
Dept: UROLOGY | Facility: HOSPITAL | Age: 55
End: 2025-03-19
Payer: COMMERCIAL

## 2025-03-19 ENCOUNTER — TELEPHONE (OUTPATIENT)
Dept: SCHEDULING | Age: 55
End: 2025-03-19

## 2025-03-19 ENCOUNTER — OFFICE VISIT (OUTPATIENT)
Dept: UROLOGY | Facility: HOSPITAL | Age: 55
End: 2025-03-19
Payer: COMMERCIAL

## 2025-03-19 DIAGNOSIS — R10.32 LEFT LOWER QUADRANT PAIN: ICD-10-CM

## 2025-03-19 DIAGNOSIS — N52.9 ERECTILE DYSFUNCTION, UNSPECIFIED ERECTILE DYSFUNCTION TYPE: Primary | ICD-10-CM

## 2025-03-19 PROCEDURE — 99204 OFFICE O/P NEW MOD 45 MIN: CPT | Performed by: UROLOGY

## 2025-03-19 PROCEDURE — 99214 OFFICE O/P EST MOD 30 MIN: CPT | Performed by: UROLOGY

## 2025-03-19 NOTE — PROGRESS NOTES
HPI    54 y.o. male being seen with the following problem list:    Problem list:  Abdominal pain/ LQ pain    03/19/25 - Has some burning in his upper left abdomen area, pain in the left lower quadrant. Occasional weak stream. No history of kidney stone. ED. Currently on Amlodipine. No hematuria.    PSA   9/2024 - 0.62  7/2023 - 0.66    Lab Results   Component Value Date    PSA 0.61 07/22/2022         Current Medications:  Current Outpatient Medications   Medication Sig Dispense Refill    albuterol 90 mcg/actuation inhaler Inhale.      amLODIPine (Norvasc) 10 mg tablet Take 1 tablet (10 mg) by mouth once daily. 90 tablet 3    azelastine (Astelin) 137 mcg (0.1 %) nasal spray INSTILL 2 SPRAYS INTO THE NOSE TWICE A DAY AS NEEDED      brimonidine-timoloL (Combigan) 0.2-0.5 % ophthalmic solution 1 drop each eye once daily      latanoprost (Xalatan) 0.005 % ophthalmic solution Administer 1 drop into both eyes once daily at bedtime.      magnesium 250 mg tablet Take by mouth every 12 hours.      meloxicam (Mobic) 15 mg tablet Take 1 tablet (15 mg) by mouth once daily. 30 tablet 1    triamcinolone (Nasacort) 55 mcg nasal inhaler Nasocort:  2 spray into each nostril daily.       No current facility-administered medications for this visit.        Active Problems:  Chalino Soriano is a 54 y.o. male with the following Problems and Medications.  Patient Active Problem List   Diagnosis    Achilles tendinitis    Acute cervical radiculopathy    Cervical spinal stenosis    Allergic rhinitis    Anxiety disorder    Situational anxiety    Arachnoid cyst    Atypical chest pain    Chronic diarrhea    Chronic ethmoidal sinusitis    Chronic sphenoidal sinusitis    Cough variant asthma ([facility])    Derangement of medial meniscus    Deviated nasal septum    Elevated blood sugar    ETD (Eustachian tube dysfunction), bilateral    Foraminal stenosis of cervical region    GERD (gastroesophageal reflux disease)    GERD with esophagitis     Cervicalgia    Headache    Insomnia    Irritable bowel syndrome    Kidney cyst, acquired    Kidney lesion, native, right    Left upper quadrant abdominal pain    Libido, decreased    Lower extremity pain    Meralgia paresthetica    Muscle strain    Myalgia    Myofascial pain    Post-nasal drip    Postural strain    Segmental and somatic dysfunction    Shoulder pain    Visual disturbances    Weight gain    Adjustment disorder with anxious mood    Abdominal pain    Neck pain    Serrated adenoma of colon    Diarrhea    Dysfunction of eustachian tube    Increased body mass index (BMI)    Stress    Costochondritis     Current Outpatient Medications   Medication Sig Dispense Refill    albuterol 90 mcg/actuation inhaler Inhale.      amLODIPine (Norvasc) 10 mg tablet Take 1 tablet (10 mg) by mouth once daily. 90 tablet 3    azelastine (Astelin) 137 mcg (0.1 %) nasal spray INSTILL 2 SPRAYS INTO THE NOSE TWICE A DAY AS NEEDED      brimonidine-timoloL (Combigan) 0.2-0.5 % ophthalmic solution 1 drop each eye once daily      latanoprost (Xalatan) 0.005 % ophthalmic solution Administer 1 drop into both eyes once daily at bedtime.      magnesium 250 mg tablet Take by mouth every 12 hours.      meloxicam (Mobic) 15 mg tablet Take 1 tablet (15 mg) by mouth once daily. 30 tablet 1    triamcinolone (Nasacort) 55 mcg nasal inhaler Nasocort:  2 spray into each nostril daily.       No current facility-administered medications for this visit.       PMH:  Past Medical History:   Diagnosis Date    Acute bronchitis due to Hemophilus influenzae 02/09/2017    Acute bronchitis due to Haemophilus influenzae    Acute recurrent pansinusitis 02/15/2018    Acute recurrent pansinusitis    Noninfective gastroenteritis and colitis, unspecified 08/19/2018    Acute gastroenteritis    Other chest pain 07/22/2022    Atypical chest pain    Other conditions influencing health status     No significant past surgical history    Other conditions influencing  health status     No prior hospitalizations    Other fecal abnormalities 06/14/2020    Stool discoloration    Personal history of other diseases of the digestive system     History of esophageal reflux    Personal history of other diseases of the musculoskeletal system and connective tissue 11/17/2017    History of neck pain    Personal history of other diseases of the respiratory system 08/08/2020    History of viral pharyngitis    Personal history of other diseases of the respiratory system 02/06/2020    History of acute bacterial sinusitis    Personal history of other specified conditions 03/31/2018    History of diarrhea       PSH:  Past Surgical History:   Procedure Laterality Date    OTHER SURGICAL HISTORY  07/22/2022    Complete colonoscopy       FMH:  Family History   Problem Relation Name Age of Onset    Hypertension Mother      Hypertension Other         SHx:  Social History     Tobacco Use    Smoking status: Never    Smokeless tobacco: Never   Vaping Use    Vaping status: Never Used   Substance Use Topics    Alcohol use: Yes     Alcohol/week: 10.0 standard drinks of alcohol     Types: 6 Glasses of wine, 4 Cans of beer per week     Comment: couple times a week    Drug use: Never       Allergies:  Allergies   Allergen Reactions    Aspirin Other    Sulfamethoxazole Hives     Physical Exam:   No TTP in abdomen  Normal penis, normal testes without ttp  Chaperone declined.     Assessment/Plan  Unclear source of abdominal pain, left LLQ pain. No history of kidney stones. I recommend getting renal US and microUA.     ED, offered treatment, he would like to hold off any treatment for now.     FU in 2-3 weeks over TH.     Michelibe Attestation  By signing my name below, I, Igor Villafuerte, attest that this documentation has been prepared under the direction and in the presence of Alfonzo Xavier MD.

## 2025-03-21 ENCOUNTER — HOSPITAL ENCOUNTER (OUTPATIENT)
Dept: RADIOLOGY | Facility: HOSPITAL | Age: 55
Discharge: HOME | End: 2025-03-21
Payer: COMMERCIAL

## 2025-03-21 DIAGNOSIS — R10.32 LEFT LOWER QUADRANT PAIN: ICD-10-CM

## 2025-03-21 PROCEDURE — 76770 US EXAM ABDO BACK WALL COMP: CPT

## 2025-03-22 LAB — COLOR UR: NORMAL

## 2025-03-28 ENCOUNTER — APPOINTMENT (OUTPATIENT)
Dept: RADIOLOGY | Facility: HOSPITAL | Age: 55
End: 2025-03-28
Payer: COMMERCIAL

## 2025-04-09 NOTE — PROGRESS NOTES
HPI    54 y.o. male being seen with the following problem list:    Problem list:  Abdominal pain/ LQ pain     03/19/25 - Has some burning in his upper left abdomen area, pain in the left lower quadrant. Occasional weak stream. No history of kidney stone. ED. Currently on Amlodipine. No hematuria.     3/21/25 renal US  -No hydronephrosis bilaterally.  -Left renal upper pole indeterminate round 2.5 cm hyperechoic cortical lesion.     -Right renal upper pole 1.5 cm mildly complex cyst with peripheral calcification as well as a left central renal possible parapelvic cyst.   -Enlarged prostate.  -No focal urinary bladder abnormality identified.    3/19/25 UA - N/A improper specimen submitted.     04/09/25 - Seen today over telehealth, performed this visit using real-time telehealth tools, including an audio/video connection between Chalino Soriano at home and Alfonzo Xavier MD at [facility]. Consent for telemedicine visit was obtained.     PSA   9/2024 - 0.62  7/2023 - 0.66    Lab Results   Component Value Date    PSA 0.61 07/22/2022              Current Medications:  Current Outpatient Medications   Medication Sig Dispense Refill    albuterol 90 mcg/actuation inhaler Inhale.      amLODIPine (Norvasc) 10 mg tablet Take 1 tablet (10 mg) by mouth once daily. 90 tablet 3    azelastine (Astelin) 137 mcg (0.1 %) nasal spray INSTILL 2 SPRAYS INTO THE NOSE TWICE A DAY AS NEEDED      brimonidine-timoloL (Combigan) 0.2-0.5 % ophthalmic solution 1 drop each eye once daily      latanoprost (Xalatan) 0.005 % ophthalmic solution Administer 1 drop into both eyes once daily at bedtime.      magnesium 250 mg tablet Take by mouth every 12 hours.      meloxicam (Mobic) 15 mg tablet Take 1 tablet (15 mg) by mouth once daily. 30 tablet 1    triamcinolone (Nasacort) 55 mcg nasal inhaler Nasocort:  2 spray into each nostril daily.       No current facility-administered medications for this visit.        Active Problems:  Chalino VERMA  Christie is a 54 y.o. male with the following Problems and Medications.  Patient Active Problem List   Diagnosis    Achilles tendinitis    Acute cervical radiculopathy    Cervical spinal stenosis    Allergic rhinitis    Anxiety disorder    Situational anxiety    Arachnoid cyst    Atypical chest pain    Chronic diarrhea    Chronic ethmoidal sinusitis    Chronic sphenoidal sinusitis    Cough variant asthma (HHS-HCC)    Derangement of medial meniscus    Deviated nasal septum    Elevated blood sugar    ETD (Eustachian tube dysfunction), bilateral    Foraminal stenosis of cervical region    GERD (gastroesophageal reflux disease)    GERD with esophagitis    Cervicalgia    Headache    Insomnia    Irritable bowel syndrome    Kidney cyst, acquired    Kidney lesion, native, right    Left upper quadrant abdominal pain    Libido, decreased    Lower extremity pain    Meralgia paresthetica    Muscle strain    Myalgia    Myofascial pain    Post-nasal drip    Postural strain    Segmental and somatic dysfunction    Shoulder pain    Visual disturbances    Weight gain    Adjustment disorder with anxious mood    Abdominal pain    Neck pain    Serrated adenoma of colon    Diarrhea    Dysfunction of eustachian tube    Increased body mass index (BMI)    Stress    Costochondritis     Current Outpatient Medications   Medication Sig Dispense Refill    albuterol 90 mcg/actuation inhaler Inhale.      amLODIPine (Norvasc) 10 mg tablet Take 1 tablet (10 mg) by mouth once daily. 90 tablet 3    azelastine (Astelin) 137 mcg (0.1 %) nasal spray INSTILL 2 SPRAYS INTO THE NOSE TWICE A DAY AS NEEDED      brimonidine-timoloL (Combigan) 0.2-0.5 % ophthalmic solution 1 drop each eye once daily      latanoprost (Xalatan) 0.005 % ophthalmic solution Administer 1 drop into both eyes once daily at bedtime.      magnesium 250 mg tablet Take by mouth every 12 hours.      meloxicam (Mobic) 15 mg tablet Take 1 tablet (15 mg) by mouth once daily. 30 tablet 1     triamcinolone (Nasacort) 55 mcg nasal inhaler Nasocort:  2 spray into each nostril daily.       No current facility-administered medications for this visit.       PMH:  Past Medical History:   Diagnosis Date    Acute bronchitis due to Hemophilus influenzae 02/09/2017    Acute bronchitis due to Haemophilus influenzae    Acute recurrent pansinusitis 02/15/2018    Acute recurrent pansinusitis    Noninfective gastroenteritis and colitis, unspecified 08/19/2018    Acute gastroenteritis    Other chest pain 07/22/2022    Atypical chest pain    Other conditions influencing health status     No significant past surgical history    Other conditions influencing health status     No prior hospitalizations    Other fecal abnormalities 06/14/2020    Stool discoloration    Personal history of other diseases of the digestive system     History of esophageal reflux    Personal history of other diseases of the musculoskeletal system and connective tissue 11/17/2017    History of neck pain    Personal history of other diseases of the respiratory system 08/08/2020    History of viral pharyngitis    Personal history of other diseases of the respiratory system 02/06/2020    History of acute bacterial sinusitis    Personal history of other specified conditions 03/31/2018    History of diarrhea       PSH:  Past Surgical History:   Procedure Laterality Date    OTHER SURGICAL HISTORY  07/22/2022    Complete colonoscopy       FMH:  Family History   Problem Relation Name Age of Onset    Hypertension Mother      Hypertension Other         SHx:  Social History     Tobacco Use    Smoking status: Never    Smokeless tobacco: Never   Vaping Use    Vaping status: Never Used   Substance Use Topics    Alcohol use: Yes     Alcohol/week: 10.0 standard drinks of alcohol     Types: 6 Glasses of wine, 4 Cans of beer per week     Comment: couple times a week    Drug use: Never       Allergies:  Allergies   Allergen Reactions    Aspirin Other     Sulfamethoxazole Hives       Physical Exam:      Assessment/Plan      Scribe Attestation  By signing my name below, I, Cali Chahal, Scribe, attest that this documentation has been prepared under the direction and in the presence of Alfonzo Xavier MD.

## 2025-04-10 ENCOUNTER — APPOINTMENT (OUTPATIENT)
Dept: UROLOGY | Facility: HOSPITAL | Age: 55
End: 2025-04-10
Payer: COMMERCIAL

## 2025-06-12 ENCOUNTER — TELEPHONE (OUTPATIENT)
Dept: GASTROENTEROLOGY | Facility: CLINIC | Age: 55
End: 2025-06-12
Payer: COMMERCIAL

## 2025-06-12 NOTE — TELEPHONE ENCOUNTER
Discussed situation with patient that he has upset stomach, acid reflux, alternating diarrhea constipation, poor sleeping in the setting of stress.  I have asked him to take Prilosec daily and Metamucil daily.  I have reassured him.  If no better make an appointment.

## 2025-06-19 DIAGNOSIS — R10.32 LEFT LOWER QUADRANT PAIN: ICD-10-CM

## 2025-06-22 LAB — BACTERIA UR CULT: NORMAL

## 2025-07-17 ENCOUNTER — OFFICE VISIT (OUTPATIENT)
Dept: GASTROENTEROLOGY | Facility: CLINIC | Age: 55
End: 2025-07-17
Payer: COMMERCIAL

## 2025-07-17 VITALS — BODY MASS INDEX: 30.51 KG/M2 | HEART RATE: 90 BPM | WEIGHT: 206 LBS | HEIGHT: 69 IN

## 2025-07-17 DIAGNOSIS — R39.9 URINARY TRACT INFECTION SYMPTOMS: ICD-10-CM

## 2025-07-17 DIAGNOSIS — R10.12 LUQ PAIN: ICD-10-CM

## 2025-07-17 DIAGNOSIS — R10.32 LLQ PAIN: Primary | ICD-10-CM

## 2025-07-17 DIAGNOSIS — R19.5 LOOSE STOOLS: ICD-10-CM

## 2025-07-17 PROCEDURE — 3008F BODY MASS INDEX DOCD: CPT

## 2025-07-17 PROCEDURE — 1036F TOBACCO NON-USER: CPT

## 2025-07-17 PROCEDURE — 99215 OFFICE O/P EST HI 40 MIN: CPT

## 2025-07-17 RX ORDER — BISMUTH SUBSALICYLATE 262 MG
1 TABLET,CHEWABLE ORAL EVERY OTHER DAY
COMMUNITY

## 2025-07-17 ASSESSMENT — ENCOUNTER SYMPTOMS
DIARRHEA: 1
FATIGUE: 0
RECTAL PAIN: 0
ABDOMINAL PAIN: 1
TROUBLE SWALLOWING: 0
VOMITING: 0
CONSTIPATION: 0
BLOOD IN STOOL: 0
ANAL BLEEDING: 0
SHORTNESS OF BREATH: 0
CHILLS: 0
FEVER: 0
COUGH: 0
APPETITE CHANGE: 0
NAUSEA: 0
ABDOMINAL DISTENTION: 0

## 2025-07-17 NOTE — ASSESSMENT & PLAN NOTE
Most likely uncontrolled GERD  - recommend patient consistently take 40 mg prilosec daily  - consider alternative PPI

## 2025-07-17 NOTE — PATIENT INSTRUCTIONS
Please take Prilosec 30-60 minutes before a meal daily.  This is to help calm stomach acid.  Please schedule our CT scan an I will notify you of results.

## 2025-07-17 NOTE — PROGRESS NOTES
Subjective     History of Present Illness:   Chalino Soriano is a 54 y.o. male with PMHx of HTN, GERD, anxiety, chronic diarrhea who presents to GI clinic for further evaluation of LLQ pain    Today, patient has had LUQ and LLQ aching pain.  Stools have been loose for the past 3 weeks with brown stools.  Normally moves bowels 2 times daily with a normal formed stool.   Has been stressed.  Coffee upsets stomach, so he has cut back.  Chart review reflects patient has been prescribed Prilosec and Metamucil, symptoms possibly suggestive of stress.  Inconsistent with prilosec use.  Denies fevers, chills    Has been seeing urology for urinary frequency.  Denies melena, hematochezia, dysphagia, unintentional weight loss    Social ETOH, denies smoking, marijuana  Denies fxh GI cancer or IBD  Abdominal Surgeries: denies    Last colonoscopy 2/2022 Dr. Treviño for screening: Internal hemorrhoids, 6 mm SSA rectum, diverticulosis.  Repeat 5 years  Last EGD 10/2018 Dr. Treviño for suspected esophageal reflux: Findings suggestive of EOE, erythematous mucosa stomach.  Pathology: Negative H. pylori, negative EOE      Past Medical History  As per HPI.     Social History  he  reports that he has never smoked. He has never used smokeless tobacco. He reports current alcohol use of about 10.0 standard drinks of alcohol per week. He reports that he does not use drugs.     Family History  his family history includes Hypertension in his mother and another family member.     Review of Systems  Review of Systems   Constitutional:  Negative for appetite change, chills, fatigue and fever.   HENT:  Negative for trouble swallowing.    Respiratory:  Negative for cough and shortness of breath.    Gastrointestinal:  Positive for abdominal pain (LLQ and LUQ) and diarrhea. Negative for abdominal distention, anal bleeding, blood in stool, constipation, nausea, rectal pain and vomiting.       Allergies  RX Allergies[1]    Medications  Current  Outpatient Medications   Medication Instructions    albuterol 90 mcg/actuation inhaler Inhale.    amLODIPine (NORVASC) 10 mg, oral, Daily    azelastine (Astelin) 137 mcg (0.1 %) nasal spray INSTILL 2 SPRAYS INTO THE NOSE TWICE A DAY AS NEEDED    brimonidine-timoloL (Combigan) 0.2-0.5 % ophthalmic solution 1 drop each eye once daily    latanoprost (Xalatan) 0.005 % ophthalmic solution 1 drop, Nightly    magnesium 250 mg tablet Every 12 hours    meloxicam (MOBIC) 15 mg, oral, Daily    multivitamin tablet 1 tablet, Every other day    triamcinolone (Nasacort) 55 mcg nasal inhaler Nasocort:  2 spray into each nostril daily.        Objective   Visit Vitals  Pulse 90      Physical Exam  Constitutional:       Appearance: Normal appearance. He is normal weight.   HENT:      Mouth/Throat:      Mouth: Mucous membranes are dry.      Pharynx: Oropharynx is clear.     Cardiovascular:      Rate and Rhythm: Normal rate and regular rhythm.   Pulmonary:      Effort: Pulmonary effort is normal.      Breath sounds: Normal breath sounds. No wheezing or rhonchi.   Abdominal:      General: Abdomen is flat. Bowel sounds are normal. There is distension.      Palpations: Abdomen is soft. There is no hepatomegaly.      Tenderness: There is abdominal tenderness (LUQ, LLQ). There is no guarding or rebound. Negative signs include Garzon's sign.      Hernia: No hernia is present.     Musculoskeletal:         General: Normal range of motion.     Skin:     General: Skin is warm and dry.     Neurological:      General: No focal deficit present.      Mental Status: He is alert and oriented to person, place, and time.     Psychiatric:         Mood and Affect: Mood normal.         Behavior: Behavior normal.           Lab Results   Component Value Date    WBC 4.8 09/27/2024    WBC 4.5 07/28/2023    WBC 7.1 04/24/2023    HGB 16.0 09/27/2024    HGB 16.0 07/28/2023    HGB 15.9 04/24/2023    HCT 45.2 09/27/2024    HCT 47.2 07/28/2023    HCT 46.7 04/24/2023      09/27/2024     07/28/2023     04/24/2023     Lab Results   Component Value Date     09/27/2024     07/28/2023     04/24/2023    K 4.4 09/27/2024    K 4.6 07/28/2023    K 4.0 04/24/2023     09/27/2024     07/28/2023     04/24/2023    CO2 26 09/27/2024    CO2 27 07/28/2023    CO2 26 04/24/2023    BUN 19 09/27/2024    BUN 20 07/28/2023    BUN 18 04/24/2023    CREATININE 0.95 09/27/2024    CREATININE 0.93 07/28/2023    CREATININE 0.92 04/24/2023    CALCIUM 9.5 09/27/2024    CALCIUM 9.3 07/28/2023    CALCIUM 9.2 04/24/2023    PROT 7.0 09/27/2024    PROT 7.1 07/28/2023    PROT 7.4 04/24/2023    BILITOT 0.7 09/27/2024    BILITOT 0.6 07/28/2023    BILITOT 0.7 04/24/2023    ALKPHOS 31 (L) 09/27/2024    ALKPHOS 33 07/28/2023    ALKPHOS 32 (L) 04/24/2023    ALT 28 09/27/2024    ALT 28 07/28/2023    ALT 30 04/24/2023    AST 19 09/27/2024    AST 20 07/28/2023    AST 23 04/24/2023    GLUCOSE 108 (H) 09/27/2024    GLUCOSE 108 (H) 07/28/2023    GLUCOSE 90 04/24/2023           Chalino Soriano is a 54 y.o. male who presents to GI clinic for LUQ and LLQ pain.    LUQ pain  Most likely uncontrolled GERD  - recommend patient consistently take 40 mg prilosec daily  - consider alternative PPI    LLQ pain  Rule out diverticulitis, consider hernia, IBS  - CT A&P ordered         Sabine Estes APRSHELLY-NOE              [1]   Allergies  Allergen Reactions    Aspirin Other    Sulfamethoxazole Hives

## 2025-07-18 ENCOUNTER — HOSPITAL ENCOUNTER (OUTPATIENT)
Dept: RADIOLOGY | Facility: CLINIC | Age: 55
Discharge: HOME | End: 2025-07-18
Payer: COMMERCIAL

## 2025-07-18 DIAGNOSIS — R10.12 LUQ PAIN: ICD-10-CM

## 2025-07-18 DIAGNOSIS — R10.32 LLQ PAIN: ICD-10-CM

## 2025-07-18 PROCEDURE — 74177 CT ABD & PELVIS W/CONTRAST: CPT

## 2025-07-18 PROCEDURE — 2550000001 HC RX 255 CONTRASTS

## 2025-07-18 RX ADMIN — IOHEXOL 75 ML: 350 INJECTION, SOLUTION INTRAVENOUS at 14:24

## 2025-08-27 ENCOUNTER — APPOINTMENT (OUTPATIENT)
Dept: UROLOGY | Facility: HOSPITAL | Age: 55
End: 2025-08-27
Payer: COMMERCIAL